# Patient Record
Sex: FEMALE | Race: WHITE | NOT HISPANIC OR LATINO | Employment: OTHER | URBAN - METROPOLITAN AREA
[De-identification: names, ages, dates, MRNs, and addresses within clinical notes are randomized per-mention and may not be internally consistent; named-entity substitution may affect disease eponyms.]

---

## 2017-01-10 ENCOUNTER — PATIENT MESSAGE (OUTPATIENT)
Dept: RHEUMATOLOGY | Facility: CLINIC | Age: 37
End: 2017-01-10

## 2017-01-10 ENCOUNTER — OFFICE VISIT (OUTPATIENT)
Dept: RHEUMATOLOGY | Facility: CLINIC | Age: 37
End: 2017-01-10
Payer: COMMERCIAL

## 2017-01-10 VITALS
WEIGHT: 130.63 LBS | BODY MASS INDEX: 24.66 KG/M2 | HEIGHT: 61 IN | SYSTOLIC BLOOD PRESSURE: 118 MMHG | DIASTOLIC BLOOD PRESSURE: 74 MMHG | HEART RATE: 79 BPM

## 2017-01-10 DIAGNOSIS — M32.8 OTHER FORMS OF SYSTEMIC LUPUS ERYTHEMATOSUS: Primary | ICD-10-CM

## 2017-01-10 PROCEDURE — 99999 PR PBB SHADOW E&M-EST. PATIENT-LVL II: CPT | Mod: PBBFAC,,, | Performed by: INTERNAL MEDICINE

## 2017-01-10 PROCEDURE — 1159F MED LIST DOCD IN RCRD: CPT | Mod: S$GLB,,, | Performed by: INTERNAL MEDICINE

## 2017-01-10 PROCEDURE — 99214 OFFICE O/P EST MOD 30 MIN: CPT | Mod: S$GLB,,, | Performed by: INTERNAL MEDICINE

## 2017-01-10 RX ORDER — HYDROXYCHLOROQUINE SULFATE 200 MG/1
200 TABLET, FILM COATED ORAL 2 TIMES DAILY
Qty: 60 TABLET | Refills: 5 | Status: SHIPPED | OUTPATIENT
Start: 2017-01-10 | End: 2017-06-26 | Stop reason: SDUPTHER

## 2017-01-10 NOTE — PROGRESS NOTES
Subjective:      Patient ID: Massiel Weston is a 34 y.o. female.    Chief Complaint: Pain    HPI   33 yo F with PMH of Hashimotos disease ( 2008), hypoglycemia, seizures here for pain. In summer 2014, she started to have to have fatigue. She also would have swelling of hands in morning.  She feels sore all over. She feels hazy.  Reports hair loss.  Reports pain in hands, wrists, and knees.  She has stiffness in morning for 2-3 hours.  Pain is 5/10.   Nothing improves the pain. Being active makes pain worse. She is on Savella for 2 months.Pain is achy and non-radiating.  She has photosensitivity over last several years. Reports painful ulcers. Hand fingers get swollen. Reports passing out in the past maybe from hypoglycemia.  Thinks she may have Raynauds. NO blood clots. Denies any miscarriages.  Reports poor sleep. No snoring.     Interval history: She has changed her diet. Reports that her pain has improved with diet.  Reports she has no pain. Denies any rashes.    She was in sun briefly but was there all day.  She continues plaquenil 200mg po BID.    Reports that her fatigue is better.  She reports that she is taking Lunesta which helps her sleep.  Reports she had one episode of 5-6 mouth ulcers that lasted a week.    Grandmother- SLE    Review of Systems :    Constitutional: Positive for fatigue. Negative for chills, diaphoresis, activity change and appetite change.   HENT: Negative for congestion, ear discharge, ear pain, facial swelling, mouth sores, sinus pressure, sneezing, sore throat, tinnitus and trouble swallowing.    Eyes: Negative for photophobia, pain, discharge, redness, itching and visual disturbance.   Respiratory: Negative for apnea, chest tightness, shortness of breath, wheezing and stridor.    Cardiovascular: Negative for leg swelling.   Gastrointestinal: Negative for nausea, abdominal pain, diarrhea, constipation, blood in stool, abdominal distention and anal bleeding.   Endocrine:  Negative for cold intolerance and heat intolerance.   Genitourinary: Negative for dysuria and difficulty urinating.   Musculoskeletal: Positive for myalgias, joint swelling, arthralgias, neck pain and neck stiffness. Negative for back pain and gait problem.   Skin: Negative for color change, pallor, rash and wound.   Neurological: Negative for dizziness, seizures, light-headedness and numbness.   Hematological: Negative for adenopathy. Does not bruise/bleed easily.   Psychiatric/Behavioral: Negative for sleep disturbance. The patient is not nervous/anxious.        Objective:   Physical Exam   Vitals reviewed.  Constitutional: She is oriented to person, place, and time.   HENT:   Head: Normocephalic and atraumatic.   Right Ear: External ear normal.   Left Ear: External ear normal.   Nose: Nose normal.   Mouth/Throat: Oropharynx is clear and moist. No oropharyngeal exudate.   Eyes: Conjunctivae and EOM are normal. Pupils are equal, round, and reactive to light. Right eye exhibits no discharge. Left eye exhibits no discharge. No scleral icterus.   Neck: Neck supple. No JVD present. No thyromegaly present.   Cardiovascular: Normal rate, regular rhythm, normal heart sounds and intact distal pulses.  Exam reveals no gallop and no friction rub.    No murmur heard.  Pulmonary/Chest: Effort normal and breath sounds normal. No respiratory distress. She has no wheezes. She has no rales. She exhibits no tenderness.   Abdominal: Soft. Bowel sounds are normal. She exhibits no distension and no mass. There is no tenderness. There is no rebound and no guarding.   Lymphadenopathy:     She has no cervical adenopathy.   Neurological: She is alert and oriented to person, place, and time. No cranial nerve deficit. Gait normal. Coordination normal.   Skin: Skin is dry. No rash noted. No erythema. No pallor.     Psychiatric: Affect and judgment normal.   Musculoskeletal: She exhibits no edema or tenderness.   FROM of all joints including  neck, shoulders, spine, ankles, wrists, knees, and ankles; no joint deformities noted or effusions, erythema or warmth; no tophi or nodules noted; no crepitus; no synovitis noted in hands or feet; no nail pitting or onycholysis        Outside labs:  Orly-1:640  dna-11  Ro,la-negative  rf-negative  Barboza, rnp-negative    Assessment:     35 yo F with PMH of Hashimotos disease ( 2008), hypoglycemia, seizures, complete hysterectomy due to fibroids, here for follow up of SLE. Patient with +ORLY, DNA, photosensitivity, Raynaud's, arthritis, and lymphopenia. Her DNA is negative here but was positive outside.   Her arthritis and myalgias have improved with decreasing processed foods.    Plan:       -continue plaquenil 200mg po BID ( eye exam due summer 2017)  -off tramadol  Tramadol  -labs today       rtc in  3-4 months

## 2017-06-26 ENCOUNTER — LAB VISIT (OUTPATIENT)
Dept: LAB | Facility: HOSPITAL | Age: 37
End: 2017-06-26
Attending: INTERNAL MEDICINE
Payer: COMMERCIAL

## 2017-06-26 ENCOUNTER — OFFICE VISIT (OUTPATIENT)
Dept: RHEUMATOLOGY | Facility: CLINIC | Age: 37
End: 2017-06-26
Payer: COMMERCIAL

## 2017-06-26 VITALS
WEIGHT: 133.31 LBS | BODY MASS INDEX: 25.17 KG/M2 | RESPIRATION RATE: 18 BRPM | SYSTOLIC BLOOD PRESSURE: 95 MMHG | HEIGHT: 61 IN | DIASTOLIC BLOOD PRESSURE: 64 MMHG | HEART RATE: 59 BPM

## 2017-06-26 DIAGNOSIS — M32.9 SYSTEMIC LUPUS ERYTHEMATOSUS, UNSPECIFIED SLE TYPE, UNSPECIFIED ORGAN INVOLVEMENT STATUS: ICD-10-CM

## 2017-06-26 DIAGNOSIS — Z79.899 LONG-TERM USE OF PLAQUENIL: ICD-10-CM

## 2017-06-26 DIAGNOSIS — M32.9 SYSTEMIC LUPUS ERYTHEMATOSUS, UNSPECIFIED SLE TYPE, UNSPECIFIED ORGAN INVOLVEMENT STATUS: Primary | ICD-10-CM

## 2017-06-26 LAB
BILIRUB UR QL STRIP: NEGATIVE
CLARITY UR REFRACT.AUTO: ABNORMAL
COLOR UR AUTO: YELLOW
CREAT UR-MCNC: 103 MG/DL
GLUCOSE UR QL STRIP: NEGATIVE
HGB UR QL STRIP: NEGATIVE
KETONES UR QL STRIP: NEGATIVE
LEUKOCYTE ESTERASE UR QL STRIP: NEGATIVE
NITRITE UR QL STRIP: NEGATIVE
PH UR STRIP: 5 [PH] (ref 5–8)
PROT UR QL STRIP: NEGATIVE
PROT UR-MCNC: <7 MG/DL
PROT/CREAT RATIO, UR: NORMAL
SP GR UR STRIP: 1.01 (ref 1–1.03)
URN SPEC COLLECT METH UR: ABNORMAL
UROBILINOGEN UR STRIP-ACNC: NEGATIVE EU/DL

## 2017-06-26 PROCEDURE — 81003 URINALYSIS AUTO W/O SCOPE: CPT

## 2017-06-26 PROCEDURE — 99214 OFFICE O/P EST MOD 30 MIN: CPT | Mod: S$GLB,,, | Performed by: INTERNAL MEDICINE

## 2017-06-26 PROCEDURE — 82570 ASSAY OF URINE CREATININE: CPT

## 2017-06-26 PROCEDURE — 99999 PR PBB SHADOW E&M-EST. PATIENT-LVL III: CPT | Mod: PBBFAC,,, | Performed by: INTERNAL MEDICINE

## 2017-06-26 RX ORDER — HYDROXYCHLOROQUINE SULFATE 200 MG/1
200 TABLET, FILM COATED ORAL 2 TIMES DAILY
Qty: 60 TABLET | Refills: 5 | Status: SHIPPED | OUTPATIENT
Start: 2017-06-26 | End: 2018-05-10 | Stop reason: SDUPTHER

## 2017-06-26 ASSESSMENT — ROUTINE ASSESSMENT OF PATIENT INDEX DATA (RAPID3)
PATIENT GLOBAL ASSESSMENT SCORE: 0
MDHAQ FUNCTION SCORE: 0
TOTAL RAPID3 SCORE: 0
AM STIFFNESS SCORE: 0, NO
PAIN SCORE: 0
FATIGUE SCORE: 2
PSYCHOLOGICAL DISTRESS SCORE: 0

## 2017-06-26 NOTE — PROGRESS NOTES
Subjective:      Patient ID: Massiel Weston is a 34 y.o. female.    Chief Complaint: Pain    HPI   35 yo F with PMH of Hashimotos disease ( 2008), hypoglycemia, seizures here for pain. In summer 2014, she started to have to have fatigue. She also would have swelling of hands in morning.  She feels sore all over. She feels hazy.  Reports hair loss.  Reports pain in hands, wrists, and knees.  She has stiffness in morning for 2-3 hours.  Pain is 5/10.   Nothing improves the pain. Being active makes pain worse. She is on Savella for 2 months.Pain is achy and non-radiating.  She has photosensitivity over last several years. Reports painful ulcers. Hand fingers get swollen. Reports passing out in the past maybe from hypoglycemia.  Thinks she may have Raynauds. NO blood clots. Denies any miscarriages.  Reports poor sleep. No snoring.     Interval history:Reports she has no pain. Denies any rashes.  She continues plaquenil 200mg po BID.    Reports that her fatigue is better.  She reports that she is taking Lunesta which helps her sleep.        Grandmother- SLE    Review of Systems :    Constitutional: Positive for fatigue. Negative for chills, diaphoresis, activity change and appetite change.   HENT: Negative for congestion, ear discharge, ear pain, facial swelling, mouth sores, sinus pressure, sneezing, sore throat, tinnitus and trouble swallowing.    Eyes: Negative for photophobia, pain, discharge, redness, itching and visual disturbance.   Respiratory: Negative for apnea, chest tightness, shortness of breath, wheezing and stridor.    Cardiovascular: Negative for leg swelling.   Gastrointestinal: Negative for nausea, abdominal pain, diarrhea, constipation, blood in stool, abdominal distention and anal bleeding.   Endocrine: Negative for cold intolerance and heat intolerance.   Genitourinary: Negative for dysuria and difficulty urinating.   Musculoskeletal: Positive for myalgias, joint swelling, arthralgias, neck  pain and neck stiffness. Negative for back pain and gait problem.   Skin: Negative for color change, pallor, rash and wound.   Neurological: Negative for dizziness, seizures, light-headedness and numbness.   Hematological: Negative for adenopathy. Does not bruise/bleed easily.   Psychiatric/Behavioral: Negative for sleep disturbance. The patient is not nervous/anxious.        Objective:   Physical Exam   Vitals reviewed.  Constitutional: She is oriented to person, place, and time.   HENT:   Head: Normocephalic and atraumatic.   Right Ear: External ear normal.   Left Ear: External ear normal.   Nose: Nose normal.   Mouth/Throat: Oropharynx is clear and moist. No oropharyngeal exudate.   Eyes: Conjunctivae and EOM are normal. Pupils are equal, round, and reactive to light. Right eye exhibits no discharge. Left eye exhibits no discharge. No scleral icterus.   Neck: Neck supple. No JVD present. No thyromegaly present.   Cardiovascular: Normal rate, regular rhythm, normal heart sounds and intact distal pulses.  Exam reveals no gallop and no friction rub.    No murmur heard.  Pulmonary/Chest: Effort normal and breath sounds normal. No respiratory distress. She has no wheezes. She has no rales. She exhibits no tenderness.   Abdominal: Soft. Bowel sounds are normal. She exhibits no distension and no mass. There is no tenderness. There is no rebound and no guarding.   Lymphadenopathy:     She has no cervical adenopathy.   Neurological: She is alert and oriented to person, place, and time. No cranial nerve deficit. Gait normal. Coordination normal.   Skin: Skin is dry. No rash noted. No erythema. No pallor.     Psychiatric: Affect and judgment normal.   Musculoskeletal: She exhibits no edema or tenderness.   FROM of all joints including neck, shoulders, spine, ankles, wrists, knees, and ankles; no joint deformities noted or effusions, erythema or warmth; no tophi or nodules noted; no crepitus; no synovitis noted in hands or  feet; no nail pitting or onycholysis        Outside labs:  Orly-1:640  dna-11  Ro,la-negative  rf-negative  Barboza, rnp-negative    Assessment:     36 yo F with PMH of Hashimotos disease ( 2008), hypoglycemia, seizures, complete hysterectomy due to fibroids, here for follow up of SLE. Patient with +ORLY, DNA, photosensitivity, Raynaud's, arthritis, and lymphopenia. Her DNA is negative here but was positive outside.   Her arthritis and myalgias have improved with decreasing processed foods.    Plan:       -continue plaquenil 200mg po BID ( eye exam due summer 2017)    -labs today       rtc in 5 months

## 2018-05-10 ENCOUNTER — PATIENT MESSAGE (OUTPATIENT)
Dept: RHEUMATOLOGY | Facility: CLINIC | Age: 38
End: 2018-05-10

## 2018-05-10 ENCOUNTER — OFFICE VISIT (OUTPATIENT)
Dept: RHEUMATOLOGY | Facility: CLINIC | Age: 38
End: 2018-05-10
Payer: COMMERCIAL

## 2018-05-10 ENCOUNTER — LAB VISIT (OUTPATIENT)
Dept: LAB | Facility: HOSPITAL | Age: 38
End: 2018-05-10
Attending: INTERNAL MEDICINE
Payer: COMMERCIAL

## 2018-05-10 VITALS
BODY MASS INDEX: 27 KG/M2 | DIASTOLIC BLOOD PRESSURE: 66 MMHG | SYSTOLIC BLOOD PRESSURE: 98 MMHG | HEIGHT: 61 IN | HEART RATE: 63 BPM | RESPIRATION RATE: 18 BRPM | WEIGHT: 143 LBS

## 2018-05-10 DIAGNOSIS — Z79.899 LONG-TERM USE OF PLAQUENIL: ICD-10-CM

## 2018-05-10 DIAGNOSIS — M32.9 SYSTEMIC LUPUS ERYTHEMATOSUS, UNSPECIFIED SLE TYPE, UNSPECIFIED ORGAN INVOLVEMENT STATUS: ICD-10-CM

## 2018-05-10 DIAGNOSIS — Z79.899 LONG-TERM USE OF PLAQUENIL: Primary | ICD-10-CM

## 2018-05-10 LAB
ALBUMIN SERPL BCP-MCNC: 3.7 G/DL
ALP SERPL-CCNC: 69 U/L
ALT SERPL W/O P-5'-P-CCNC: 12 U/L
ANION GAP SERPL CALC-SCNC: 6 MMOL/L
AST SERPL-CCNC: 17 U/L
BASOPHILS # BLD AUTO: 0.07 K/UL
BASOPHILS NFR BLD: 1.6 %
BILIRUB SERPL-MCNC: 0.4 MG/DL
BUN SERPL-MCNC: 14 MG/DL
C3 SERPL-MCNC: 104 MG/DL
C4 SERPL-MCNC: 27 MG/DL
CALCIUM SERPL-MCNC: 9.6 MG/DL
CHLORIDE SERPL-SCNC: 105 MMOL/L
CO2 SERPL-SCNC: 29 MMOL/L
CREAT SERPL-MCNC: 1 MG/DL
CRP SERPL-MCNC: <0.1 MG/L
DIFFERENTIAL METHOD: ABNORMAL
DSDNA AB SER-ACNC: NORMAL [IU]/ML
EOSINOPHIL # BLD AUTO: 0.3 K/UL
EOSINOPHIL NFR BLD: 7.6 %
ERYTHROCYTE [DISTWIDTH] IN BLOOD BY AUTOMATED COUNT: 11.9 %
ERYTHROCYTE [SEDIMENTATION RATE] IN BLOOD BY WESTERGREN METHOD: 2 MM/HR
EST. GFR  (AFRICAN AMERICAN): >60 ML/MIN/1.73 M^2
EST. GFR  (NON AFRICAN AMERICAN): >60 ML/MIN/1.73 M^2
GLUCOSE SERPL-MCNC: 86 MG/DL
HCT VFR BLD AUTO: 40.3 %
HGB BLD-MCNC: 13.7 G/DL
IMM GRANULOCYTES # BLD AUTO: 0.01 K/UL
IMM GRANULOCYTES NFR BLD AUTO: 0.2 %
LYMPHOCYTES # BLD AUTO: 1.1 K/UL
LYMPHOCYTES NFR BLD: 25 %
MCH RBC QN AUTO: 31.7 PG
MCHC RBC AUTO-ENTMCNC: 34 G/DL
MCV RBC AUTO: 93 FL
MONOCYTES # BLD AUTO: 0.5 K/UL
MONOCYTES NFR BLD: 10.8 %
NEUTROPHILS # BLD AUTO: 2.4 K/UL
NEUTROPHILS NFR BLD: 54.8 %
NRBC BLD-RTO: 0 /100 WBC
PLATELET # BLD AUTO: 176 K/UL
PMV BLD AUTO: 10.7 FL
POTASSIUM SERPL-SCNC: 4.6 MMOL/L
PROT SERPL-MCNC: 7 G/DL
RBC # BLD AUTO: 4.32 M/UL
SODIUM SERPL-SCNC: 140 MMOL/L
WBC # BLD AUTO: 4.36 K/UL

## 2018-05-10 PROCEDURE — 80053 COMPREHEN METABOLIC PANEL: CPT

## 2018-05-10 PROCEDURE — 85025 COMPLETE CBC W/AUTO DIFF WBC: CPT

## 2018-05-10 PROCEDURE — 99999 PR PBB SHADOW E&M-EST. PATIENT-LVL III: CPT | Mod: PBBFAC,,, | Performed by: INTERNAL MEDICINE

## 2018-05-10 PROCEDURE — 86160 COMPLEMENT ANTIGEN: CPT

## 2018-05-10 PROCEDURE — 85651 RBC SED RATE NONAUTOMATED: CPT

## 2018-05-10 PROCEDURE — 86225 DNA ANTIBODY NATIVE: CPT

## 2018-05-10 PROCEDURE — 99214 OFFICE O/P EST MOD 30 MIN: CPT | Mod: S$GLB,,, | Performed by: INTERNAL MEDICINE

## 2018-05-10 PROCEDURE — 86160 COMPLEMENT ANTIGEN: CPT | Mod: 59

## 2018-05-10 PROCEDURE — 3008F BODY MASS INDEX DOCD: CPT | Mod: CPTII,S$GLB,, | Performed by: INTERNAL MEDICINE

## 2018-05-10 PROCEDURE — 86140 C-REACTIVE PROTEIN: CPT

## 2018-05-10 PROCEDURE — 36415 COLL VENOUS BLD VENIPUNCTURE: CPT

## 2018-05-10 RX ORDER — HYDROXYCHLOROQUINE SULFATE 200 MG/1
200 TABLET, FILM COATED ORAL 2 TIMES DAILY
Qty: 60 TABLET | Refills: 5 | Status: SHIPPED | OUTPATIENT
Start: 2018-05-10 | End: 2018-07-05 | Stop reason: SDUPTHER

## 2018-05-10 NOTE — PROGRESS NOTES
Subjective:      Patient ID: Massiel Weston is a 34 y.o. female.    Chief Complaint: Pain    HPI   33 yo F with PMH of Hashimotos disease ( 2008), hypoglycemia, seizures here for pain. In summer 2014, she started to have to have fatigue. She also would have swelling of hands in morning.  She feels sore all over. She feels hazy.  Reports hair loss.  Reports pain in hands, wrists, and knees.  She has stiffness in morning for 2-3 hours.  Pain is 5/10.   Nothing improves the pain. Being active makes pain worse. She is on Savella for 2 months.Pain is achy and non-radiating.  She has photosensitivity over last several years. Reports painful ulcers. Hand fingers get swollen. Reports passing out in the past maybe from hypoglycemia.  Thinks she may have Raynauds. NO blood clots. Denies any miscarriages.  Reports poor sleep. No snoring.     Interval history: She reports she broke her foot before tien and worse cast around 8 weeks.  Reports she has no pain. Denies any rashes.  She continues plaquenil 200mg po BID.    Reports that her fatigue is better.          Grandmother- SLE    Review of Systems :    Constitutional: Positive for fatigue. Negative for chills, diaphoresis, activity change and appetite change.   HENT: Negative for congestion, ear discharge, ear pain, facial swelling, mouth sores, sinus pressure, sneezing, sore throat, tinnitus and trouble swallowing.    Eyes: Negative for photophobia, pain, discharge, redness, itching and visual disturbance.   Respiratory: Negative for apnea, chest tightness, shortness of breath, wheezing and stridor.    Cardiovascular: Negative for leg swelling.   Gastrointestinal: Negative for nausea, abdominal pain, diarrhea, constipation, blood in stool, abdominal distention and anal bleeding.   Endocrine: Negative for cold intolerance and heat intolerance.   Genitourinary: Negative for dysuria and difficulty urinating.   Musculoskeletal: Positive for myalgias, joint swelling,  arthralgias, neck pain and neck stiffness. Negative for back pain and gait problem.   Skin: Negative for color change, pallor, rash and wound.   Neurological: Negative for dizziness, seizures, light-headedness and numbness.   Hematological: Negative for adenopathy. Does not bruise/bleed easily.   Psychiatric/Behavioral: Negative for sleep disturbance. The patient is not nervous/anxious.        Objective:   Physical Exam   Vitals reviewed.  Constitutional: She is oriented to person, place, and time.   HENT:   Head: Normocephalic and atraumatic.   Right Ear: External ear normal.   Left Ear: External ear normal.   Nose: Nose normal.   Mouth/Throat: Oropharynx is clear and moist. No oropharyngeal exudate.   Eyes: Conjunctivae and EOM are normal. Pupils are equal, round, and reactive to light. Right eye exhibits no discharge. Left eye exhibits no discharge. No scleral icterus.   Neck: Neck supple. No JVD present. No thyromegaly present.   Cardiovascular: Normal rate, regular rhythm, normal heart sounds and intact distal pulses.  Exam reveals no gallop and no friction rub.    No murmur heard.  Pulmonary/Chest: Effort normal and breath sounds normal. No respiratory distress. She has no wheezes. She has no rales. She exhibits no tenderness.   Abdominal: Soft. Bowel sounds are normal. She exhibits no distension and no mass. There is no tenderness. There is no rebound and no guarding.   Lymphadenopathy:     She has no cervical adenopathy.   Neurological: She is alert and oriented to person, place, and time. No cranial nerve deficit. Gait normal. Coordination normal.   Skin: Skin is dry. No rash noted. No erythema. No pallor.     Psychiatric: Affect and judgment normal.   Musculoskeletal: She exhibits no edema or tenderness.   FROM of all joints including neck, shoulders, spine, ankles, wrists, knees, and ankles; no joint deformities noted or effusions, erythema or warmth; no tophi or nodules noted; no crepitus; no synovitis  noted in hands or feet; no nail pitting or onycholysis        Outside labs:  Orly-1:640  dna-11  Ro,la-negative  rf-negative  Barboza, rnp-negative    Assessment:     37 yo F with PMH of Hashimotos disease ( 2008), hypoglycemia, seizures, complete hysterectomy due to fibroids, here for follow up of SLE. Patient with +ORLY, DNA, photosensitivity, Raynaud's, arthritis, and lymphopenia. Her DNA is negative here but was positive outside.   Her arthritis and myalgias have improved with decreasing processed foods.    Plan:       -continue plaquenil 200mg po BID ( eye exam due summer 2018)    -labs today       rtc in 6   months

## 2018-07-05 RX ORDER — HYDROXYCHLOROQUINE SULFATE 200 MG/1
TABLET, FILM COATED ORAL
Qty: 60 TABLET | Refills: 6 | Status: SHIPPED | OUTPATIENT
Start: 2018-07-05 | End: 2019-05-16 | Stop reason: SDUPTHER

## 2018-11-01 ENCOUNTER — OFFICE VISIT (OUTPATIENT)
Dept: RHEUMATOLOGY | Facility: CLINIC | Age: 38
End: 2018-11-01
Payer: COMMERCIAL

## 2018-11-01 ENCOUNTER — LAB VISIT (OUTPATIENT)
Dept: LAB | Facility: HOSPITAL | Age: 38
End: 2018-11-01
Attending: INTERNAL MEDICINE
Payer: COMMERCIAL

## 2018-11-01 VITALS
HEART RATE: 61 BPM | HEIGHT: 61 IN | DIASTOLIC BLOOD PRESSURE: 60 MMHG | SYSTOLIC BLOOD PRESSURE: 92 MMHG | BODY MASS INDEX: 27.02 KG/M2

## 2018-11-01 DIAGNOSIS — M25.50 POLYARTHRALGIA: Primary | ICD-10-CM

## 2018-11-01 DIAGNOSIS — Z79.899 LONG-TERM USE OF PLAQUENIL: ICD-10-CM

## 2018-11-01 DIAGNOSIS — M25.50 POLYARTHRALGIA: ICD-10-CM

## 2018-11-01 DIAGNOSIS — M32.9 SYSTEMIC LUPUS ERYTHEMATOSUS, UNSPECIFIED SLE TYPE, UNSPECIFIED ORGAN INVOLVEMENT STATUS: ICD-10-CM

## 2018-11-01 LAB
ALBUMIN SERPL BCP-MCNC: 3.8 G/DL
ALP SERPL-CCNC: 64 U/L
ALT SERPL W/O P-5'-P-CCNC: 13 U/L
ANION GAP SERPL CALC-SCNC: 6 MMOL/L
AST SERPL-CCNC: 21 U/L
BASOPHILS # BLD AUTO: 0.06 K/UL
BASOPHILS NFR BLD: 1.5 %
BILIRUB SERPL-MCNC: 0.6 MG/DL
BUN SERPL-MCNC: 14 MG/DL
C3 SERPL-MCNC: 104 MG/DL
C4 SERPL-MCNC: 30 MG/DL
CALCIUM SERPL-MCNC: 9.1 MG/DL
CHLORIDE SERPL-SCNC: 104 MMOL/L
CO2 SERPL-SCNC: 28 MMOL/L
CREAT SERPL-MCNC: 0.9 MG/DL
CRP SERPL-MCNC: 0.1 MG/L
DIFFERENTIAL METHOD: ABNORMAL
EOSINOPHIL # BLD AUTO: 0.3 K/UL
EOSINOPHIL NFR BLD: 7.9 %
ERYTHROCYTE [DISTWIDTH] IN BLOOD BY AUTOMATED COUNT: 12.2 %
ERYTHROCYTE [SEDIMENTATION RATE] IN BLOOD BY WESTERGREN METHOD: <2 MM/HR
EST. GFR  (AFRICAN AMERICAN): >60 ML/MIN/1.73 M^2
EST. GFR  (NON AFRICAN AMERICAN): >60 ML/MIN/1.73 M^2
ESTIMATED AVG GLUCOSE: 88 MG/DL
GLUCOSE SERPL-MCNC: 81 MG/DL
HBA1C MFR BLD HPLC: 4.7 %
HCT VFR BLD AUTO: 37.7 %
HGB BLD-MCNC: 13.3 G/DL
IMM GRANULOCYTES # BLD AUTO: 0.01 K/UL
IMM GRANULOCYTES NFR BLD AUTO: 0.3 %
LYMPHOCYTES # BLD AUTO: 1.4 K/UL
LYMPHOCYTES NFR BLD: 36.7 %
MCH RBC QN AUTO: 32.4 PG
MCHC RBC AUTO-ENTMCNC: 35.3 G/DL
MCV RBC AUTO: 92 FL
MONOCYTES # BLD AUTO: 0.4 K/UL
MONOCYTES NFR BLD: 8.9 %
NEUTROPHILS # BLD AUTO: 1.8 K/UL
NEUTROPHILS NFR BLD: 44.7 %
NRBC BLD-RTO: 0 /100 WBC
PLATELET # BLD AUTO: 186 K/UL
PMV BLD AUTO: 10.5 FL
POTASSIUM SERPL-SCNC: 3.8 MMOL/L
PROT SERPL-MCNC: 7.2 G/DL
RBC # BLD AUTO: 4.1 M/UL
SODIUM SERPL-SCNC: 138 MMOL/L
WBC # BLD AUTO: 3.92 K/UL

## 2018-11-01 PROCEDURE — 86160 COMPLEMENT ANTIGEN: CPT

## 2018-11-01 PROCEDURE — 86225 DNA ANTIBODY NATIVE: CPT

## 2018-11-01 PROCEDURE — 85652 RBC SED RATE AUTOMATED: CPT

## 2018-11-01 PROCEDURE — 86160 COMPLEMENT ANTIGEN: CPT | Mod: 59

## 2018-11-01 PROCEDURE — 3008F BODY MASS INDEX DOCD: CPT | Mod: CPTII,S$GLB,, | Performed by: INTERNAL MEDICINE

## 2018-11-01 PROCEDURE — 36415 COLL VENOUS BLD VENIPUNCTURE: CPT

## 2018-11-01 PROCEDURE — 99214 OFFICE O/P EST MOD 30 MIN: CPT | Mod: S$GLB,,, | Performed by: INTERNAL MEDICINE

## 2018-11-01 PROCEDURE — 99999 PR PBB SHADOW E&M-EST. PATIENT-LVL III: CPT | Mod: PBBFAC,,, | Performed by: INTERNAL MEDICINE

## 2018-11-01 PROCEDURE — 85025 COMPLETE CBC W/AUTO DIFF WBC: CPT

## 2018-11-01 PROCEDURE — 86140 C-REACTIVE PROTEIN: CPT

## 2018-11-01 PROCEDURE — 80053 COMPREHEN METABOLIC PANEL: CPT

## 2018-11-01 PROCEDURE — 83036 HEMOGLOBIN GLYCOSYLATED A1C: CPT

## 2018-11-01 RX ORDER — PENTOSAN POLYSULFATE SODIUM 100 MG/1
100 CAPSULE, GELATIN COATED ORAL 2 TIMES DAILY
COMMUNITY
Start: 2018-10-25 | End: 2021-11-17

## 2018-11-01 RX ORDER — ESZOPICLONE 3 MG/1
TABLET, FILM COATED ORAL
Refills: 5 | COMMUNITY
Start: 2018-10-20 | End: 2019-08-30 | Stop reason: SDUPTHER

## 2018-11-01 NOTE — PROGRESS NOTES
Subjective:      Patient ID: Massiel Weston is a 34 y.o. female.    Chief Complaint: Pain    HPI   35 yo F with PMH of Hashimotos disease ( 2008), hypoglycemia, seizures here for pain. In summer 2014, she started to have to have fatigue. She also would have swelling of hands in morning.  She feels sore all over. She feels hazy.  Reports hair loss.  Reports pain in hands, wrists, and knees.  She has stiffness in morning for 2-3 hours.  Pain is 5/10.   Nothing improves the pain. Being active makes pain worse. She is on Savella for 2 months.Pain is achy and non-radiating.  She has photosensitivity over last several years. Reports painful ulcers. Hand fingers get swollen. Reports passing out in the past maybe from hypoglycemia.  Thinks she may have Raynauds. NO blood clots. Denies any miscarriages.  Reports poor sleep. No snoring.     Interval history: She reports she has mild hand pain. Reports mild swelling in hands in morning.  Reports diffuse body stiffness in morning for an hour. Denies any rashes.  She continues plaquenil 200mg po BID.    Reports that her fatigue is better. Reports she had oral ulcers in her mouth 3 months ago but had sun exposure.          Grandmother- SLE    Review of Systems :    Constitutional: Positive for fatigue. Negative for chills, diaphoresis, activity change and appetite change.   HENT: Negative for congestion, ear discharge, ear pain, facial swelling, mouth sores, sinus pressure, sneezing, sore throat, tinnitus and trouble swallowing.    Eyes: Negative for photophobia, pain, discharge, redness, itching and visual disturbance.   Respiratory: Negative for apnea, chest tightness, shortness of breath, wheezing and stridor.    Cardiovascular: Negative for leg swelling.   Gastrointestinal: Negative for nausea, abdominal pain, diarrhea, constipation, blood in stool, abdominal distention and anal bleeding.   Endocrine: Negative for cold intolerance and heat intolerance.   Genitourinary:  Negative for dysuria and difficulty urinating.   Musculoskeletal: Positive for myalgias, joint swelling, arthralgias, neck pain and neck stiffness. Negative for back pain and gait problem.   Skin: Negative for color change, pallor, rash and wound.   Neurological: Negative for dizziness, seizures, light-headedness and numbness.   Hematological: Negative for adenopathy. Does not bruise/bleed easily.   Psychiatric/Behavioral: Negative for sleep disturbance. The patient is not nervous/anxious.        Objective:   Physical Exam   Vitals reviewed.  Constitutional: She is oriented to person, place, and time.   HENT:   Head: Normocephalic and atraumatic.   Right Ear: External ear normal.   Left Ear: External ear normal.   Nose: Nose normal.   Mouth/Throat: Oropharynx is clear and moist. No oropharyngeal exudate.   Eyes: Conjunctivae and EOM are normal. Pupils are equal, round, and reactive to light. Right eye exhibits no discharge. Left eye exhibits no discharge. No scleral icterus.   Neck: Neck supple. No JVD present. No thyromegaly present.   Cardiovascular: Normal rate, regular rhythm, normal heart sounds and intact distal pulses.  Exam reveals no gallop and no friction rub.    No murmur heard.  Pulmonary/Chest: Effort normal and breath sounds normal. No respiratory distress. She has no wheezes. She has no rales. She exhibits no tenderness.   Abdominal: Soft. Bowel sounds are normal. She exhibits no distension and no mass. There is no tenderness. There is no rebound and no guarding.   Lymphadenopathy:     She has no cervical adenopathy.   Neurological: She is alert and oriented to person, place, and time. No cranial nerve deficit. Gait normal. Coordination normal.   Skin: Skin is dry. No rash noted. No erythema. No pallor.     Psychiatric: Affect and judgment normal.   Musculoskeletal: She exhibits no edema or tenderness.   FROM of all joints including neck, shoulders, spine, ankles, wrists, knees, and ankles; no joint  deformities noted or effusions, erythema or warmth; no tophi or nodules noted; no crepitus; no synovitis noted in hands or feet; no nail pitting or onycholysis        Outside labs:  Orly-1:640  dna-11  Ro,la-negative  rf-negative  Barboza, rnp-negative    Assessment:     37 yo F with PMH of Hashimotos disease ( 2008), hypoglycemia, seizures, complete hysterectomy due to fibroids, here for follow up of SLE. Patient with +ORLY, DNA, photosensitivity, Raynaud's, arthritis, and lymphopenia. Her DNA is negative here but was positive outside.   Her arthritis and myalgias have improved with decreasing processed foods.    Reports that her optho found early signs of diabetes so will check a1c.  Plan:       -continue plaquenil 200mg po BID ( eye exam done outside in October 2018)  -labs today     rtc in 6   months

## 2018-11-02 ENCOUNTER — PATIENT MESSAGE (OUTPATIENT)
Dept: RHEUMATOLOGY | Facility: CLINIC | Age: 38
End: 2018-11-02

## 2018-11-02 LAB — DSDNA AB SER-ACNC: NORMAL [IU]/ML

## 2019-05-02 PROBLEM — K81.1 CHRONIC CHOLECYSTITIS WITHOUT CALCULUS: Status: ACTIVE | Noted: 2019-05-02

## 2019-05-16 ENCOUNTER — LAB VISIT (OUTPATIENT)
Dept: LAB | Facility: HOSPITAL | Age: 39
End: 2019-05-16
Attending: INTERNAL MEDICINE
Payer: COMMERCIAL

## 2019-05-16 ENCOUNTER — OFFICE VISIT (OUTPATIENT)
Dept: RHEUMATOLOGY | Facility: CLINIC | Age: 39
End: 2019-05-16
Payer: COMMERCIAL

## 2019-05-16 VITALS
HEART RATE: 96 BPM | WEIGHT: 135.81 LBS | BODY MASS INDEX: 25.64 KG/M2 | SYSTOLIC BLOOD PRESSURE: 96 MMHG | DIASTOLIC BLOOD PRESSURE: 59 MMHG | HEIGHT: 61 IN

## 2019-05-16 DIAGNOSIS — Z79.899 LONG-TERM USE OF PLAQUENIL: ICD-10-CM

## 2019-05-16 DIAGNOSIS — M32.9 SYSTEMIC LUPUS ERYTHEMATOSUS, UNSPECIFIED SLE TYPE, UNSPECIFIED ORGAN INVOLVEMENT STATUS: Primary | ICD-10-CM

## 2019-05-16 DIAGNOSIS — M32.9 SYSTEMIC LUPUS ERYTHEMATOSUS, UNSPECIFIED SLE TYPE, UNSPECIFIED ORGAN INVOLVEMENT STATUS: ICD-10-CM

## 2019-05-16 LAB
ALBUMIN SERPL BCP-MCNC: 3.8 G/DL (ref 3.5–5.2)
ALP SERPL-CCNC: 72 U/L (ref 55–135)
ALT SERPL W/O P-5'-P-CCNC: 15 U/L (ref 10–44)
ANION GAP SERPL CALC-SCNC: 8 MMOL/L (ref 8–16)
AST SERPL-CCNC: 21 U/L (ref 10–40)
BASOPHILS # BLD AUTO: 0.08 K/UL (ref 0–0.2)
BASOPHILS NFR BLD: 2.3 % (ref 0–1.9)
BILIRUB SERPL-MCNC: 0.7 MG/DL (ref 0.1–1)
BUN SERPL-MCNC: 12 MG/DL (ref 6–20)
C3 SERPL-MCNC: 120 MG/DL (ref 50–180)
C4 SERPL-MCNC: 34 MG/DL (ref 11–44)
CALCIUM SERPL-MCNC: 9.7 MG/DL (ref 8.7–10.5)
CHLORIDE SERPL-SCNC: 103 MMOL/L (ref 95–110)
CO2 SERPL-SCNC: 27 MMOL/L (ref 23–29)
CREAT SERPL-MCNC: 0.9 MG/DL (ref 0.5–1.4)
CRP SERPL-MCNC: 0.3 MG/L (ref 0–8.2)
DIFFERENTIAL METHOD: ABNORMAL
EOSINOPHIL # BLD AUTO: 0.3 K/UL (ref 0–0.5)
EOSINOPHIL NFR BLD: 7.1 % (ref 0–8)
ERYTHROCYTE [DISTWIDTH] IN BLOOD BY AUTOMATED COUNT: 11.6 % (ref 11.5–14.5)
ERYTHROCYTE [SEDIMENTATION RATE] IN BLOOD BY WESTERGREN METHOD: 3 MM/HR (ref 0–36)
ERYTHROCYTE [SEDIMENTATION RATE] IN BLOOD BY WESTERGREN METHOD: 3 MM/HR (ref 0–36)
EST. GFR  (AFRICAN AMERICAN): >60 ML/MIN/1.73 M^2
EST. GFR  (NON AFRICAN AMERICAN): >60 ML/MIN/1.73 M^2
GLUCOSE SERPL-MCNC: 85 MG/DL (ref 70–110)
HCT VFR BLD AUTO: 39.1 % (ref 37–48.5)
HGB BLD-MCNC: 13.6 G/DL (ref 12–16)
IMM GRANULOCYTES # BLD AUTO: 0.01 K/UL (ref 0–0.04)
IMM GRANULOCYTES NFR BLD AUTO: 0.3 % (ref 0–0.5)
LYMPHOCYTES # BLD AUTO: 1.2 K/UL (ref 1–4.8)
LYMPHOCYTES NFR BLD: 33 % (ref 18–48)
MCH RBC QN AUTO: 32.2 PG (ref 27–31)
MCHC RBC AUTO-ENTMCNC: 34.8 G/DL (ref 32–36)
MCV RBC AUTO: 92 FL (ref 82–98)
MONOCYTES # BLD AUTO: 0.4 K/UL (ref 0.3–1)
MONOCYTES NFR BLD: 11.1 % (ref 4–15)
NEUTROPHILS # BLD AUTO: 1.6 K/UL (ref 1.8–7.7)
NEUTROPHILS NFR BLD: 46.2 % (ref 38–73)
NRBC BLD-RTO: 0 /100 WBC
PLATELET # BLD AUTO: 219 K/UL (ref 150–350)
PMV BLD AUTO: 9.8 FL (ref 9.2–12.9)
POTASSIUM SERPL-SCNC: 3.9 MMOL/L (ref 3.5–5.1)
PROT SERPL-MCNC: 7.1 G/DL (ref 6–8.4)
RBC # BLD AUTO: 4.23 M/UL (ref 4–5.4)
SODIUM SERPL-SCNC: 138 MMOL/L (ref 136–145)
WBC # BLD AUTO: 3.52 K/UL (ref 3.9–12.7)

## 2019-05-16 PROCEDURE — 86225 DNA ANTIBODY NATIVE: CPT

## 2019-05-16 PROCEDURE — 3008F PR BODY MASS INDEX (BMI) DOCUMENTED: ICD-10-PCS | Mod: CPTII,S$GLB,, | Performed by: INTERNAL MEDICINE

## 2019-05-16 PROCEDURE — 85025 COMPLETE CBC W/AUTO DIFF WBC: CPT

## 2019-05-16 PROCEDURE — 99999 PR PBB SHADOW E&M-EST. PATIENT-LVL III: CPT | Mod: PBBFAC,,, | Performed by: INTERNAL MEDICINE

## 2019-05-16 PROCEDURE — 85652 RBC SED RATE AUTOMATED: CPT

## 2019-05-16 PROCEDURE — 36415 COLL VENOUS BLD VENIPUNCTURE: CPT

## 2019-05-16 PROCEDURE — 99214 OFFICE O/P EST MOD 30 MIN: CPT | Mod: S$GLB,,, | Performed by: INTERNAL MEDICINE

## 2019-05-16 PROCEDURE — 80053 COMPREHEN METABOLIC PANEL: CPT

## 2019-05-16 PROCEDURE — 86140 C-REACTIVE PROTEIN: CPT

## 2019-05-16 PROCEDURE — 99999 PR PBB SHADOW E&M-EST. PATIENT-LVL III: ICD-10-PCS | Mod: PBBFAC,,, | Performed by: INTERNAL MEDICINE

## 2019-05-16 PROCEDURE — 3008F BODY MASS INDEX DOCD: CPT | Mod: CPTII,S$GLB,, | Performed by: INTERNAL MEDICINE

## 2019-05-16 PROCEDURE — 86160 COMPLEMENT ANTIGEN: CPT

## 2019-05-16 PROCEDURE — 99214 PR OFFICE/OUTPT VISIT, EST, LEVL IV, 30-39 MIN: ICD-10-PCS | Mod: S$GLB,,, | Performed by: INTERNAL MEDICINE

## 2019-05-16 PROCEDURE — 86160 COMPLEMENT ANTIGEN: CPT | Mod: 59

## 2019-05-16 RX ORDER — HYDROXYCHLOROQUINE SULFATE 200 MG/1
200 TABLET, FILM COATED ORAL 2 TIMES DAILY
Qty: 60 TABLET | Refills: 6 | Status: SHIPPED | OUTPATIENT
Start: 2019-05-16 | End: 2019-11-12 | Stop reason: SDUPTHER

## 2019-05-16 NOTE — PROGRESS NOTES
Subjective:      Patient ID: Massiel Weston is a 34 y.o. female.    Chief Complaint: Pain    HPI   35 yo F with PMH of Hashimotos disease ( 2008), hypoglycemia, seizures here for pain. In summer 2014, she started to have to have fatigue. She also would have swelling of hands in morning.  She feels sore all over. She feels hazy.  Reports hair loss.  Reports pain in hands, wrists, and knees.  She has stiffness in morning for 2-3 hours.  Pain is 5/10.   Nothing improves the pain. Being active makes pain worse. She is on Savella for 2 months.Pain is achy and non-radiating.  She has photosensitivity over last several years. Reports painful ulcers. Hand fingers get swollen. Reports passing out in the past maybe from hypoglycemia.  Thinks she may have Raynauds. NO blood clots. Denies any miscarriages.  Reports poor sleep. No snoring.     Interval history:  She had gallbladder out  2 weeks ago. Denies any rashes, oral ulcers, fevers, or raynauds.  She reports she has mild hand pain. Pain level is 2/10, aching non radiating.  Reports diffuse body stiffness in morning for an hour. Denies any rashes.She continues plaquenil 200mg po BID.  Reports that her fatigue is better.        Grandmother- SLE    Review of Systems :    Constitutional: Positive for fatigue. Negative for chills, diaphoresis, activity change and appetite change.   HENT: Negative for congestion, ear discharge, ear pain, facial swelling, mouth sores, sinus pressure, sneezing, sore throat, tinnitus and trouble swallowing.    Eyes: Negative for photophobia, pain, discharge, redness, itching and visual disturbance.   Respiratory: Negative for apnea, chest tightness, shortness of breath, wheezing and stridor.    Cardiovascular: Negative for leg swelling.   Gastrointestinal: Negative for nausea, abdominal pain, diarrhea, constipation, blood in stool, abdominal distention and anal bleeding.   Endocrine: Negative for cold intolerance and heat intolerance.    Genitourinary: Negative for dysuria and difficulty urinating.   Musculoskeletal: Positive for myalgias, joint swelling, arthralgias, neck pain and neck stiffness. Negative for back pain and gait problem.   Skin: Negative for color change, pallor, rash and wound.   Neurological: Negative for dizziness, seizures, light-headedness and numbness.   Hematological: Negative for adenopathy. Does not bruise/bleed easily.   Psychiatric/Behavioral: Negative for sleep disturbance. The patient is not nervous/anxious.        Objective:   Physical Exam   Vitals reviewed.  Constitutional: She is oriented to person, place, and time.   HENT:   Head: Normocephalic and atraumatic.   Right Ear: External ear normal.   Left Ear: External ear normal.   Nose: Nose normal.   Mouth/Throat: Oropharynx is clear and moist. No oropharyngeal exudate.   Eyes: Conjunctivae and EOM are normal. Pupils are equal, round, and reactive to light. Right eye exhibits no discharge. Left eye exhibits no discharge. No scleral icterus.   Neck: Neck supple. No JVD present. No thyromegaly present.   Cardiovascular: Normal rate, regular rhythm, normal heart sounds and intact distal pulses.  Exam reveals no gallop and no friction rub.    No murmur heard.  Pulmonary/Chest: Effort normal and breath sounds normal. No respiratory distress. She has no wheezes. She has no rales. She exhibits no tenderness.   Abdominal: Soft. Bowel sounds are normal. She exhibits no distension and no mass. There is no tenderness. There is no rebound and no guarding.   Lymphadenopathy:     She has no cervical adenopathy.   Neurological: She is alert and oriented to person, place, and time. No cranial nerve deficit. Gait normal. Coordination normal.   Skin: Skin is dry. No rash noted. No erythema. No pallor.     Psychiatric: Affect and judgment normal.   Musculoskeletal: She exhibits no edema or tenderness.   FROM of all joints including neck, shoulders, spine, ankles, wrists, knees, and  ankles; no joint deformities noted or effusions, erythema or warmth; no tophi or nodules noted; no crepitus; no synovitis noted in hands or feet; no nail pitting or onycholysis        Outside labs:  Orly-1:640  dna-11  Ro,la-negative  rf-negative  Barboza, rnp-negative    Assessment:     40 yo F with PMH of Hashimotos disease ( 2008), hypoglycemia, seizures, complete hysterectomy due to fibroids, here for follow up of SLE. Patient with +ORLY, DNA, photosensitivity, Raynaud's, arthritis, and lymphopenia. Her DNA is negative here but was positive outside.   Her arthritis and myalgias have improved with decreasing processed foods.   Plan:       -continue plaquenil 200mg po BID ( eye exam done outside in October 2018)  -labs today     rtc in 6   months

## 2019-05-17 ENCOUNTER — PATIENT MESSAGE (OUTPATIENT)
Dept: RHEUMATOLOGY | Facility: CLINIC | Age: 39
End: 2019-05-17

## 2019-05-17 LAB — DSDNA AB SER-ACNC: NORMAL [IU]/ML

## 2019-07-08 ENCOUNTER — PATIENT MESSAGE (OUTPATIENT)
Dept: RHEUMATOLOGY | Facility: CLINIC | Age: 39
End: 2019-07-08

## 2019-07-10 PROBLEM — Z71.3 DIETARY COUNSELING: Status: ACTIVE | Noted: 2019-07-10

## 2019-07-10 PROBLEM — Z51.81 ENCOUNTER FOR THERAPEUTIC DRUG MONITORING: Status: ACTIVE | Noted: 2019-07-10

## 2019-09-10 PROBLEM — Z79.890 HORMONE REPLACEMENT THERAPY: Chronic | Status: ACTIVE | Noted: 2019-09-10

## 2019-09-10 PROBLEM — K81.1 CHRONIC CHOLECYSTITIS WITHOUT CALCULUS: Status: RESOLVED | Noted: 2019-05-02 | Resolved: 2019-09-10

## 2019-10-03 ENCOUNTER — TELEPHONE (OUTPATIENT)
Dept: RHEUMATOLOGY | Facility: CLINIC | Age: 39
End: 2019-10-03

## 2019-10-03 ENCOUNTER — PATIENT MESSAGE (OUTPATIENT)
Dept: RHEUMATOLOGY | Facility: CLINIC | Age: 39
End: 2019-10-03

## 2019-10-03 NOTE — TELEPHONE ENCOUNTER
Left message for patient to call me back concerning her appointment that will cancelled. I also left a message on her my [facility] portal concerning the cancelling and rescheduling of this appointment

## 2019-11-12 ENCOUNTER — LAB VISIT (OUTPATIENT)
Dept: LAB | Facility: HOSPITAL | Age: 39
End: 2019-11-12
Attending: INTERNAL MEDICINE
Payer: COMMERCIAL

## 2019-11-12 ENCOUNTER — OFFICE VISIT (OUTPATIENT)
Dept: RHEUMATOLOGY | Facility: CLINIC | Age: 39
End: 2019-11-12
Payer: COMMERCIAL

## 2019-11-12 VITALS
DIASTOLIC BLOOD PRESSURE: 65 MMHG | HEART RATE: 72 BPM | HEIGHT: 61 IN | SYSTOLIC BLOOD PRESSURE: 107 MMHG | WEIGHT: 135.81 LBS | BODY MASS INDEX: 25.64 KG/M2

## 2019-11-12 DIAGNOSIS — M32.9 SYSTEMIC LUPUS ERYTHEMATOSUS, UNSPECIFIED SLE TYPE, UNSPECIFIED ORGAN INVOLVEMENT STATUS: Primary | ICD-10-CM

## 2019-11-12 DIAGNOSIS — M32.9 SYSTEMIC LUPUS ERYTHEMATOSUS, UNSPECIFIED SLE TYPE, UNSPECIFIED ORGAN INVOLVEMENT STATUS: ICD-10-CM

## 2019-11-12 DIAGNOSIS — Z79.899 LONG-TERM USE OF PLAQUENIL: ICD-10-CM

## 2019-11-12 LAB
APTT BLDCRRT: 28.6 SEC (ref 21–32)
BASOPHILS # BLD AUTO: 0.04 K/UL (ref 0–0.2)
BASOPHILS # BLD AUTO: 0.04 K/UL (ref 0–0.2)
BASOPHILS NFR BLD: 1.4 % (ref 0–1.9)
BASOPHILS NFR BLD: 1.4 % (ref 0–1.9)
C3 SERPL-MCNC: 105 MG/DL (ref 50–180)
C4 SERPL-MCNC: 33 MG/DL (ref 11–44)
DIFFERENTIAL METHOD: ABNORMAL
DIFFERENTIAL METHOD: ABNORMAL
EOSINOPHIL # BLD AUTO: 0.2 K/UL (ref 0–0.5)
EOSINOPHIL # BLD AUTO: 0.2 K/UL (ref 0–0.5)
EOSINOPHIL NFR BLD: 6.2 % (ref 0–8)
EOSINOPHIL NFR BLD: 6.2 % (ref 0–8)
ERYTHROCYTE [DISTWIDTH] IN BLOOD BY AUTOMATED COUNT: 12 % (ref 11.5–14.5)
ERYTHROCYTE [DISTWIDTH] IN BLOOD BY AUTOMATED COUNT: 12 % (ref 11.5–14.5)
HCT VFR BLD AUTO: 36.5 % (ref 37–48.5)
HCT VFR BLD AUTO: 36.5 % (ref 37–48.5)
HGB BLD-MCNC: 12.4 G/DL (ref 12–16)
HGB BLD-MCNC: 12.4 G/DL (ref 12–16)
IMM GRANULOCYTES # BLD AUTO: 0 K/UL (ref 0–0.04)
IMM GRANULOCYTES # BLD AUTO: 0 K/UL (ref 0–0.04)
IMM GRANULOCYTES NFR BLD AUTO: 0 % (ref 0–0.5)
IMM GRANULOCYTES NFR BLD AUTO: 0 % (ref 0–0.5)
LYMPHOCYTES # BLD AUTO: 0.9 K/UL (ref 1–4.8)
LYMPHOCYTES # BLD AUTO: 0.9 K/UL (ref 1–4.8)
LYMPHOCYTES NFR BLD: 31.8 % (ref 18–48)
LYMPHOCYTES NFR BLD: 31.8 % (ref 18–48)
MCH RBC QN AUTO: 32.4 PG (ref 27–31)
MCH RBC QN AUTO: 32.4 PG (ref 27–31)
MCHC RBC AUTO-ENTMCNC: 34 G/DL (ref 32–36)
MCHC RBC AUTO-ENTMCNC: 34 G/DL (ref 32–36)
MCV RBC AUTO: 95 FL (ref 82–98)
MCV RBC AUTO: 95 FL (ref 82–98)
MONOCYTES # BLD AUTO: 0.3 K/UL (ref 0.3–1)
MONOCYTES # BLD AUTO: 0.3 K/UL (ref 0.3–1)
MONOCYTES NFR BLD: 11.3 % (ref 4–15)
MONOCYTES NFR BLD: 11.3 % (ref 4–15)
NEUTROPHILS # BLD AUTO: 1.4 K/UL (ref 1.8–7.7)
NEUTROPHILS # BLD AUTO: 1.4 K/UL (ref 1.8–7.7)
NEUTROPHILS NFR BLD: 49.3 % (ref 38–73)
NEUTROPHILS NFR BLD: 49.3 % (ref 38–73)
NRBC BLD-RTO: 0 /100 WBC
NRBC BLD-RTO: 0 /100 WBC
PLATELET # BLD AUTO: 159 K/UL (ref 150–350)
PLATELET # BLD AUTO: 159 K/UL (ref 150–350)
PMV BLD AUTO: 10.6 FL (ref 9.2–12.9)
PMV BLD AUTO: 10.6 FL (ref 9.2–12.9)
RBC # BLD AUTO: 3.83 M/UL (ref 4–5.4)
RBC # BLD AUTO: 3.83 M/UL (ref 4–5.4)
WBC # BLD AUTO: 2.92 K/UL (ref 3.9–12.7)
WBC # BLD AUTO: 2.92 K/UL (ref 3.9–12.7)

## 2019-11-12 PROCEDURE — 85025 COMPLETE CBC W/AUTO DIFF WBC: CPT

## 2019-11-12 PROCEDURE — 85730 THROMBOPLASTIN TIME PARTIAL: CPT

## 2019-11-12 PROCEDURE — 99214 PR OFFICE/OUTPT VISIT, EST, LEVL IV, 30-39 MIN: ICD-10-PCS | Mod: S$GLB,,, | Performed by: INTERNAL MEDICINE

## 2019-11-12 PROCEDURE — 86225 DNA ANTIBODY NATIVE: CPT

## 2019-11-12 PROCEDURE — 3008F BODY MASS INDEX DOCD: CPT | Mod: CPTII,S$GLB,, | Performed by: INTERNAL MEDICINE

## 2019-11-12 PROCEDURE — 99999 PR PBB SHADOW E&M-EST. PATIENT-LVL III: ICD-10-PCS | Mod: PBBFAC,,, | Performed by: INTERNAL MEDICINE

## 2019-11-12 PROCEDURE — 86160 COMPLEMENT ANTIGEN: CPT

## 2019-11-12 PROCEDURE — 3008F PR BODY MASS INDEX (BMI) DOCUMENTED: ICD-10-PCS | Mod: CPTII,S$GLB,, | Performed by: INTERNAL MEDICINE

## 2019-11-12 PROCEDURE — 86160 COMPLEMENT ANTIGEN: CPT | Mod: 59

## 2019-11-12 PROCEDURE — 36415 COLL VENOUS BLD VENIPUNCTURE: CPT

## 2019-11-12 PROCEDURE — 99999 PR PBB SHADOW E&M-EST. PATIENT-LVL III: CPT | Mod: PBBFAC,,, | Performed by: INTERNAL MEDICINE

## 2019-11-12 PROCEDURE — 99214 OFFICE O/P EST MOD 30 MIN: CPT | Mod: S$GLB,,, | Performed by: INTERNAL MEDICINE

## 2019-11-12 RX ORDER — HYDROXYCHLOROQUINE SULFATE 200 MG/1
200 TABLET, FILM COATED ORAL 2 TIMES DAILY
Qty: 60 TABLET | Refills: 6 | Status: SHIPPED | OUTPATIENT
Start: 2019-11-12 | End: 2020-10-19

## 2019-11-12 NOTE — PROGRESS NOTES
Subjective:      Patient ID: Massiel Weston is a 34 y.o. female.    Chief Complaint: Pain    HPI   35 yo F with PMH of Hashimotos disease ( 2008), hypoglycemia, seizures here for pain. In summer 2014, she started to have to have fatigue. She also would have swelling of hands in morning.  She feels sore all over. She feels hazy.  Reports hair loss.  Reports pain in hands, wrists, and knees.  She has stiffness in morning for 2-3 hours.  Pain is 5/10.   Nothing improves the pain. Being active makes pain worse. She is on Savella for 2 months.Pain is achy and non-radiating.  She has photosensitivity over last several years. Reports painful ulcers. Hand fingers get swollen. Reports passing out in the past maybe from hypoglycemia.  Thinks she may have Raynauds. NO blood clots. Denies any miscarriages.  Reports poor sleep. No snoring.     Interval history:  . Denies any rashes, oral ulcers, fevers, or raynauds.She has been having easy bruising over the last several months. She had ear infection several weeks ago .  She reports she has mild hand pain. Pain level is 2/10, aching non radiating.  Reports diffuse body stiffness in morning for an hour. Denies any rashes.She continues plaquenil 200mg po BID.  Reports that her fatigue is better.        Grandmother- SLE    Review of Systems :    Constitutional: Positive for fatigue. Negative for chills, diaphoresis, activity change and appetite change.   HENT: Negative for congestion, ear discharge, ear pain, facial swelling, mouth sores, sinus pressure, sneezing, sore throat, tinnitus and trouble swallowing.    Eyes: Negative for photophobia, pain, discharge, redness, itching and visual disturbance.   Respiratory: Negative for apnea, chest tightness, shortness of breath, wheezing and stridor.    Cardiovascular: Negative for leg swelling.   Gastrointestinal: Negative for nausea, abdominal pain, diarrhea, constipation, blood in stool, abdominal distention and anal bleeding.    Endocrine: Negative for cold intolerance and heat intolerance.   Genitourinary: Negative for dysuria and difficulty urinating.   Musculoskeletal: Positive for myalgias, joint swelling, arthralgias, neck pain and neck stiffness. Negative for back pain and gait problem.   Skin: Negative for color change, pallor, rash and wound.   Neurological: Negative for dizziness, seizures, light-headedness and numbness.   Hematological: Negative for adenopathy. Does not bruise/bleed easily.   Psychiatric/Behavioral: Negative for sleep disturbance. The patient is not nervous/anxious.        Objective:   Physical Exam   Vitals reviewed.  Constitutional: She is oriented to person, place, and time.   HENT:   Head: Normocephalic and atraumatic.   Right Ear: External ear normal.   Left Ear: External ear normal.   Nose: Nose normal.   Mouth/Throat: Oropharynx is clear and moist. No oropharyngeal exudate.   Eyes: Conjunctivae and EOM are normal. Pupils are equal, round, and reactive to light. Right eye exhibits no discharge. Left eye exhibits no discharge. No scleral icterus.   Neck: Neck supple. No JVD present. No thyromegaly present.   Cardiovascular: Normal rate, regular rhythm, normal heart sounds and intact distal pulses.  Exam reveals no gallop and no friction rub.    No murmur heard.  Pulmonary/Chest: Effort normal and breath sounds normal. No respiratory distress. She has no wheezes. She has no rales. She exhibits no tenderness.   Abdominal: Soft. Bowel sounds are normal. She exhibits no distension and no mass. There is no tenderness. There is no rebound and no guarding.   Lymphadenopathy:     She has no cervical adenopathy.   Neurological: She is alert and oriented to person, place, and time. No cranial nerve deficit. Gait normal. Coordination normal.   Skin: Skin is dry. No rash noted. No erythema. No pallor.     Psychiatric: Affect and judgment normal.   Musculoskeletal: She exhibits no edema or tenderness.   FROM of all  joints including neck, shoulders, spine, ankles, wrists, knees, and ankles; no joint deformities noted or effusions, erythema or warmth; no tophi or nodules noted; no crepitus; no synovitis noted in hands or feet; no nail pitting or onycholysis        Outside labs:  Orly-1:640  dna-11  Ro,la-negative  rf-negative  Barboza, rnp-negative    Assessment:     38 yo F with PMH of Hashimotos disease ( 2008), hypoglycemia, seizures, complete hysterectomy due to fibroids, here for follow up of SLE. Patient with +ORLY, DNA, photosensitivity, Raynaud's, arthritis, and lymphopenia. Her DNA is negative here but was positive outside.   Her arthritis and myalgias have improved with decreasing processed foods.   Plan:       -continue plaquenil 200mg po BID    -labs today     rtc in 3 months

## 2019-11-13 LAB — DSDNA AB SER-ACNC: NORMAL [IU]/ML

## 2019-11-18 ENCOUNTER — PATIENT MESSAGE (OUTPATIENT)
Dept: RHEUMATOLOGY | Facility: CLINIC | Age: 39
End: 2019-11-18

## 2019-11-20 ENCOUNTER — TELEPHONE (OUTPATIENT)
Dept: RHEUMATOLOGY | Facility: CLINIC | Age: 39
End: 2019-11-20

## 2019-11-20 ENCOUNTER — TELEPHONE (OUTPATIENT)
Dept: HEMATOLOGY/ONCOLOGY | Facility: CLINIC | Age: 39
End: 2019-11-20

## 2019-11-20 DIAGNOSIS — T14.8XXA BRUISING: Primary | ICD-10-CM

## 2019-11-20 NOTE — TELEPHONE ENCOUNTER
Spoke with patient,s he has her appointment the the hematologist and I told her don't take anymore OTC per Dr Meyer

## 2019-12-03 ENCOUNTER — INITIAL CONSULT (OUTPATIENT)
Dept: HEMATOLOGY/ONCOLOGY | Facility: CLINIC | Age: 39
End: 2019-12-03
Payer: COMMERCIAL

## 2019-12-03 ENCOUNTER — LAB VISIT (OUTPATIENT)
Dept: LAB | Facility: HOSPITAL | Age: 39
End: 2019-12-03
Payer: COMMERCIAL

## 2019-12-03 VITALS
HEART RATE: 85 BPM | BODY MASS INDEX: 24.39 KG/M2 | RESPIRATION RATE: 16 BRPM | OXYGEN SATURATION: 100 % | DIASTOLIC BLOOD PRESSURE: 65 MMHG | TEMPERATURE: 98 F | SYSTOLIC BLOOD PRESSURE: 109 MMHG | WEIGHT: 129.19 LBS | HEIGHT: 61 IN

## 2019-12-03 DIAGNOSIS — R23.3 ABNORMAL BRUISING: ICD-10-CM

## 2019-12-03 DIAGNOSIS — D72.819 LEUKOPENIA, UNSPECIFIED TYPE: ICD-10-CM

## 2019-12-03 DIAGNOSIS — R23.3 ABNORMAL BRUISING: Primary | ICD-10-CM

## 2019-12-03 LAB
FIBRINOGEN PPP-MCNC: 368 MG/DL (ref 182–366)
INR PPP: 1 (ref 0.8–1.2)
PROTHROMBIN TIME: 10.6 SEC (ref 9–12.5)

## 2019-12-03 PROCEDURE — 99999 PR PBB SHADOW E&M-EST. PATIENT-LVL III: CPT | Mod: PBBFAC,,,

## 2019-12-03 PROCEDURE — 85384 FIBRINOGEN ACTIVITY: CPT

## 2019-12-03 PROCEDURE — 3008F BODY MASS INDEX DOCD: CPT | Mod: CPTII,S$GLB,,

## 2019-12-03 PROCEDURE — 85240 CLOT FACTOR VIII AHG 1 STAGE: CPT

## 2019-12-03 PROCEDURE — 85246 CLOT FACTOR VIII VW ANTIGEN: CPT

## 2019-12-03 PROCEDURE — 3008F PR BODY MASS INDEX (BMI) DOCUMENTED: ICD-10-PCS | Mod: CPTII,S$GLB,,

## 2019-12-03 PROCEDURE — 85610 PROTHROMBIN TIME: CPT

## 2019-12-03 PROCEDURE — 99204 PR OFFICE/OUTPT VISIT, NEW, LEVL IV, 45-59 MIN: ICD-10-PCS | Mod: S$GLB,,,

## 2019-12-03 PROCEDURE — 99204 OFFICE O/P NEW MOD 45 MIN: CPT | Mod: S$GLB,,,

## 2019-12-03 PROCEDURE — 36415 COLL VENOUS BLD VENIPUNCTURE: CPT

## 2019-12-03 PROCEDURE — 99999 PR PBB SHADOW E&M-EST. PATIENT-LVL III: ICD-10-PCS | Mod: PBBFAC,,,

## 2019-12-03 RX ORDER — METHYLPREDNISOLONE 4 MG/1
TABLET ORAL
Refills: 0 | COMMUNITY
Start: 2019-10-21 | End: 2019-12-03

## 2019-12-03 RX ORDER — BUTALBITAL, ACETAMINOPHEN AND CAFFEINE 50; 325; 40 MG/1; MG/1; MG/1
1 TABLET ORAL EVERY 6 HOURS PRN
Refills: 0 | COMMUNITY
Start: 2019-10-21 | End: 2020-03-10

## 2019-12-03 NOTE — PROGRESS NOTES
SLE well controlled on plaquenil   Long standing autoimmune leukopenia; monitor  Bruising with no worrisome clinical quality; heavy menses but had hysterectomy years ago; some gingival bleeding with brushing  Thinks there may have been bleeding disorder in other family member but not sure of name; she will call family members to further elucidate and message us back  Coags, fibrinogen, VWD panel; +/- plt agg/functional assay studies pending what family condition she has   meds reivewed no clear culprits  Call pt with results

## 2019-12-04 ENCOUNTER — PATIENT MESSAGE (OUTPATIENT)
Dept: HEMATOLOGY/ONCOLOGY | Facility: CLINIC | Age: 39
End: 2019-12-04

## 2019-12-04 NOTE — PROGRESS NOTES
[facility]  [facility]  Hematology/Medical Oncology Clinic      PATIENT: Massiel Weston  MRN: [id number]  DATE: 12/3/2019    Diagnosis:   1. Abnormal bruising    2. Leukopenia, unspecified type      Chief Complaint: Follow-up    Other MDs:  Alethea Phillips NP    Subjective:   Initial History: Ms. Weston is a 39 y.o. female who presents to clinic as a consult for abnormal bruising.     She has a previous medical history of SLE, AI thyroiditis, raynauds and leukopenia.    She is taking plaquenil every day.     She complains of long standing history of easy bruising that has gotten worse recently. States that even the slightest touch could cause her to bruise for multiple days. She also report slight mucosal bleeding and heavy menses in the past.      She is s/p hysterectomy, appendectomy and cholecystectomy without need for blood products.     Says she may have had a grandfather and/or a cousins that have had a bleeding disorder though she is unable of the actual name of the diagnosis.     She denies OTC medications, NSAIDS, supplements, ginkgo, etc.     Has had some steroids recently for URI-like symptoms that she has noticed increase bruising since taking them.     Has children that have had surgeries without need for blood products.     No hx of VTE in herself or family.    She appears well today without much bruising.     Denies blood in stool, urine. Has never coughed up blood. No hx of SSRI use.    PTT was done prior to consult as was normal.     Past Medical History:   Diagnosis Date    Adjustment insomnia     Adult ADHD (attention deficit hyperactivity disorder)     Asthma     as child    Bulimia nervosa in remission     Cyst of right kidney     Hashimoto's thyroiditis 01/2010    Hormone replacement therapy 9/10/2019    [employer]    Hyperreflexia     Hypoglycemic disorder     IC (interstitial cystitis)     Leukopenia     Nausea & vomiting     Other forms of  systemic lupus erythematosus 8/1/2015    Partial complex seizure disorder without intractable epilepsy 2001, 2006    during pregnancy    PCOS (polycystic ovarian syndrome)     Prediabetes     Raynaud disease     Scoliosis of thoracic region due to degenerative disease of spine in adult     Sinus tachycardia 09/2018    SLE (systemic lupus erythematosus) 08/2015    Stress fracture, left foot, initial encounter for fracture 08/2017    no surgery, casted     Past Surgical History:   Procedure Laterality Date    ABLATION OF ENDOMETRIUM USING RESECTOSCOPE  2006    Yusuf    APPENDECTOMY      CYSTOSCOPY WITH URETHRAL DILATION  03/2018    Jeronimo    IMPLANT INTERSTITIAL RADIUM BRACHYTHERAPY INTO TONGUE      [facility] 8/2016 KASSANDRA Estrella    LAPAROSCOPIC CHOLECYSTECTOMY N/A 5/2/2019    Procedure: CHOLECYSTECTOMY, LAPAROSCOPIC;  Surgeon: Ryan Corona MD;  Location: [facility];  Service: General;  Laterality: N/A;    OOPHORECTOMY Left 08/2015    Yusuf    TOTAL ABDOMINAL HYSTERECTOMY W/ BILATERAL SALPINGOOPHORECTOMY  2010    Yusuf, kept left ovary    tubal ligation       Family History   Problem Relation Age of Onset    Hypertension Mother     Diabetes Father     COPD Father     Hyperlipidemia Father     Heart disease Father       reports that she has never smoked. She has never used smokeless tobacco. She reports that she does not drink alcohol or use drugs.  Review of patient's allergies indicates:   Allergen Reactions    Demerol [meperidine] Swelling and Rash    Percocet [oxycodone-acetaminophen] Swelling and Rash     Current Outpatient Medications   Medication Sig Dispense Refill    butalbital-acetaminophen-caffeine -40 mg (FIORICET, ESGIC) -40 mg per tablet Take 1 tablet by mouth every 6 (six) hours as needed.  0    ELMIRON 100 mg Cap Take 100 mg by mouth 2 (two) times daily.       eszopiclone (LUNESTA) 3 mg Tab TAKE ONE TABLET BY MOUTH EVERY EVENING AT BEDTIME 30 tablet 5     "hydroxychloroquine (PLAQUENIL) 200 mg tablet Take 1 tablet (200 mg total) by mouth 2 (two) times daily. 60 tablet 6     No current facility-administered medications for this visit.      Review of Systems   Constitutional: Negative for appetite change, chills, fatigue and unexpected weight change.   HENT: Negative for dental problem, mouth sores, nosebleeds, trouble swallowing and voice change.    Eyes: Negative for visual disturbance.   Respiratory: Negative for chest tightness and shortness of breath.    Cardiovascular: Negative for chest pain, palpitations and leg swelling.   Gastrointestinal: Negative for abdominal distention, abdominal pain, blood in stool, diarrhea, nausea and vomiting.   Endocrine: Negative for cold intolerance.   Genitourinary: Negative for hematuria.   Musculoskeletal: Negative for neck pain.   Skin: Negative for pallor and rash.   Allergic/Immunologic: Negative for immunocompromised state.   Neurological: Negative for dizziness, weakness and headaches.   Hematological: Negative for adenopathy. Bruises/bleeds easily.   Psychiatric/Behavioral: Negative for agitation and behavioral problems.     ECOG Performance Status: 0    Objective:      Vitals:   Vitals:    12/03/19 1607   BP: 109/65   BP Location: Right arm   Patient Position: Sitting   BP Method: Large (Automatic)   Pulse: 85   Resp: 16   Temp: 98 °F (36.7 °C)   TempSrc: Oral   SpO2: 100%   Weight: 58.6 kg (129 lb 3 oz)   Height: 5' 1" (1.549 m)     BMI: Body mass index is 24.41 kg/m².    Physical Exam   Constitutional: She is oriented to person, place, and time. She appears well-developed and well-nourished.   HENT:   Head: Normocephalic and atraumatic.   Eyes: Pupils are equal, round, and reactive to light. EOM are normal.   Neck: Normal range of motion. Neck supple.   Cardiovascular: Normal rate and regular rhythm.   Pulmonary/Chest: Effort normal and breath sounds normal. She has no wheezes.   Abdominal: Soft. Bowel sounds are " normal. She exhibits no mass.   Musculoskeletal: Normal range of motion. She exhibits no edema.   Lymphadenopathy:        Head (right side): No submandibular, no posterior auricular and no occipital adenopathy present.        Head (left side): No submandibular, no posterior auricular and no occipital adenopathy present.     She has no cervical adenopathy.     She has no axillary adenopathy.        Right: No inguinal and no supraclavicular adenopathy present.        Left: No inguinal and no supraclavicular adenopathy present.   Neurological: She is alert and oriented to person, place, and time.   Skin: Skin is warm and dry.   No obvious areas of extensive bruising.    Psychiatric: She has a normal mood and affect. Her behavior is normal.   Vitals reviewed.      Laboratory Data:  Lab Visit on 12/03/2019   Component Date Value Ref Range Status    Fibrinogen 12/03/2019 368* 182 - 366 mg/dL Final    Prothrombin Time 12/03/2019 10.6  9.0 - 12.5 sec Final    INR 12/03/2019 1.0  0.8 - 1.2 Final    Comment: Coumadin Therapy:  2.0 - 3.0 for INR for all indicators except mechanical heart valves  and antiphospholipid syndromes which should use 2.5 - 3.5.       Assessment:       1. Abnormal bruising    2. Leukopenia, unspecified type      Hematologic History:      See below    Plan:     Mrs. Weston is here today for her history of easy bruising that appears to be chronic though reports has maybe gradually become more worrisome over the past few weeks. With her history of heavy menses and mucosal bleeding, something like a mild vWf disorder can be possible though certainly not going to be significant as she has had 2-3 major surgeries in the past that have not required blood products or extensive bleeding. Her children also appear to be okay. We will test her for vWF disease nd platelet times today and if they are normal, it is not going to be something that needs to be worried about going forward.     Regarding her  "leukopenia, it appears this is long standing and she reports that she remember being told as a kid that her WBCs are low. Going back in the EMR it appears her WBC are low/stable. Likely 2/2 to her underlying AI disease + Plaquenil.     -vwf profile   -PT/INR  -see if she can find the name of the "disorder" her grandfather may have had and relay that back to us to see if it is a testable disease  -cbc 6-12 months to monitor counts   -if work up is negative, can report results over the phone and f/u with PCP   -if positive, will see back in a couple weeks to discuss results and plan of care    Discussed with Dr. Calles.    Tanmay Jones MD  Hematology/Medical Oncology Fellow  [phone] (pager)    "

## 2019-12-05 LAB
FACT VIII ACT/NOR PPP: 88 % (ref 55–200)
VWF AG ACT/NOR PPP IA: 109 % (ref 55–200)
VWF:AC ACT/NOR PPP IA: 72 % (ref 55–200)

## 2019-12-09 ENCOUNTER — PATIENT MESSAGE (OUTPATIENT)
Dept: HEMATOLOGY/ONCOLOGY | Facility: CLINIC | Age: 39
End: 2019-12-09

## 2020-03-10 PROBLEM — N30.10 IC (INTERSTITIAL CYSTITIS): Chronic | Status: ACTIVE | Noted: 2020-03-10

## 2020-03-10 PROBLEM — Z79.899 LONG-TERM USE OF PLAQUENIL: Status: ACTIVE | Noted: 2020-03-10

## 2020-03-10 PROBLEM — F15.90 OTHER STIMULANT USE, UNSPECIFIED, UNCOMPLICATED: Status: ACTIVE | Noted: 2020-03-10

## 2020-07-28 ENCOUNTER — OFFICE VISIT (OUTPATIENT)
Dept: RHEUMATOLOGY | Facility: CLINIC | Age: 40
End: 2020-07-28
Payer: COMMERCIAL

## 2020-07-28 DIAGNOSIS — M32.9 SYSTEMIC LUPUS ERYTHEMATOSUS, UNSPECIFIED SLE TYPE, UNSPECIFIED ORGAN INVOLVEMENT STATUS: Primary | ICD-10-CM

## 2020-07-28 DIAGNOSIS — Z79.899 LONG-TERM USE OF PLAQUENIL: ICD-10-CM

## 2020-07-28 PROCEDURE — 99214 PR OFFICE/OUTPT VISIT, EST, LEVL IV, 30-39 MIN: ICD-10-PCS | Mod: 95,,, | Performed by: INTERNAL MEDICINE

## 2020-07-28 PROCEDURE — 99214 OFFICE O/P EST MOD 30 MIN: CPT | Mod: 95,,, | Performed by: INTERNAL MEDICINE

## 2020-07-28 ASSESSMENT — ROUTINE ASSESSMENT OF PATIENT INDEX DATA (RAPID3)
FATIGUE SCORE: 0
TOTAL RAPID3 SCORE: 1.33
WHEN YOU AWAKENED IN THE MORNING OVER THE LAST WEEK, PLEASE INDICATE THE AMOUNT OF TIME IT TAKES UNTIL YOU ARE AS LIMBER AS YOU WILL BE FOR THE DAY: 1 HOUR
AM STIFFNESS SCORE: 1, YES
PSYCHOLOGICAL DISTRESS SCORE: 1.1
MDHAQ FUNCTION SCORE: 0
PATIENT GLOBAL ASSESSMENT SCORE: 1
PAIN SCORE: 3

## 2020-07-28 NOTE — PROGRESS NOTES
Subjective:      Patient ID: Massiel Weston is a 34 y.o. female.    Chief Complaint: Pain    HPI   35 yo F with PMH of Hashimotos disease ( 2008), hypoglycemia, seizures here for pain. In summer 2014, she started to have to have fatigue. She also would have swelling of hands in morning.  She feels sore all over. She feels hazy.  Reports hair loss.  Reports pain in hands, wrists, and knees.  She has stiffness in morning for 2-3 hours.  Pain is 5/10.   Nothing improves the pain. Being active makes pain worse. She is on Savella for 2 months.Pain is achy and non-radiating.  She has photosensitivity over last several years. Reports painful ulcers. Hand fingers get swollen. Reports passing out in the past maybe from hypoglycemia.  Thinks she may have Raynauds. NO blood clots. Denies any miscarriages.  Reports poor sleep. No snoring.     Interval history:  . Denies any rashes, oral ulcers, fevers, or raynauds. Reports improving in bruising.  She reports she has mild hand pain. Pain level is 2/10, aching non radiating.  Reports diffuse body stiffness in morning for an hour. Denies any rashes.She continues plaquenil 200mg po BID.  Reports that her fatigue is better.        Grandmother- SLE    Review of Systems :    Constitutional: Positive for fatigue. Negative for chills, diaphoresis, activity change and appetite change.   HENT: Negative for congestion, ear discharge, ear pain, facial swelling, mouth sores, sinus pressure, sneezing, sore throat, tinnitus and trouble swallowing.    Eyes: Negative for photophobia, pain, discharge, redness, itching and visual disturbance.   Respiratory: Negative for apnea, chest tightness, shortness of breath, wheezing and stridor.    Cardiovascular: Negative for leg swelling.   Gastrointestinal: Negative for nausea, abdominal pain, diarrhea, constipation, blood in stool, abdominal distention and anal bleeding.   Endocrine: Negative for cold intolerance and heat intolerance.    Genitourinary: Negative for dysuria and difficulty urinating.   Musculoskeletal: Positive for myalgias, joint swelling, arthralgias, neck pain and neck stiffness. Negative for back pain and gait problem.   Skin: Negative for color change, pallor, rash and wound.   Neurological: Negative for dizziness, seizures, light-headedness and numbness.   Hematological: Negative for adenopathy. Does not bruise/bleed easily.   Psychiatric/Behavioral: Negative for sleep disturbance. The patient is not nervous/anxious.        Objective:   Physical Exam   Vitals reviewed.  Constitutional: She is oriented to person, place, and time.   HENT:   Head: Normocephalic and atraumatic.   Right Ear: External ear normal.   Left Ear: External ear normal.   Nose: Nose normal.   Mouth/Throat: Oropharynx is clear and moist. No oropharyngeal exudate.   Eyes: Conjunctivae and EOM are normal. Pupils are equal, round, and reactive to light. Right eye exhibits no discharge. Left eye exhibits no discharge. No scleral icterus.   Neck: Neck supple. No JVD present. No thyromegaly present.   Cardiovascular: Normal rate, regular rhythm, normal heart sounds and intact distal pulses.  Exam reveals no gallop and no friction rub.    No murmur heard.  Pulmonary/Chest: Effort normal and breath sounds normal. No respiratory distress. She has no wheezes. She has no rales. She exhibits no tenderness.   Abdominal: Soft. Bowel sounds are normal. She exhibits no distension and no mass. There is no tenderness. There is no rebound and no guarding.   Lymphadenopathy:     She has no cervical adenopathy.   Neurological: She is alert and oriented to person, place, and time. No cranial nerve deficit. Gait normal. Coordination normal.   Skin: Skin is dry. No rash noted. No erythema. No pallor.     Psychiatric: Affect and judgment normal.   Musculoskeletal: She exhibits no edema or tenderness.   FROM of all joints including neck, shoulders, spine, ankles, wrists, knees, and  ankles; no joint deformities noted or effusions, erythema or warmth; no tophi or nodules noted; no crepitus; no synovitis noted in hands or feet; no nail pitting or onycholysis        Outside labs:  Orly-1:640  dna-11  Ro,la-negative  rf-negative  Barboza, rnp-negative    Assessment:   41 yo F with PMH of Hashimotos disease ( 2008), hypoglycemia, seizures, complete hysterectomy due to fibroids, here for follow up of SLE. Patient with +ORLY, DNA, photosensitivity, Raynaud's, arthritis, and lymphopenia. Her DNA is negative here but was positive outside.   Her arthritis and myalgias have improved with decreasing processed foods.   Plan:       -continue plaquenil 200mg po BID  (has upcoming eye exam)    -labs at [facility] (next week)  [facility] in 6 months

## 2020-07-28 NOTE — PROGRESS NOTES
Rapid3 Question Responses and Scores 7/27/2020   MDHAQ Score 0   Psychologic Score 1.1   Pain Score 3   When you awakened in the morning OVER THE LAST WEEK, did you feel stiff? Yes   If Yes, please indicate the number of hours until you are as limber as you will be for the day 1   Fatigue Score 1   Global Health Score 0   RAPID3 Score 1

## 2020-07-28 NOTE — PROGRESS NOTES
Subjective:      Patient ID: Massiel Weston is a 34 y.o. female.    Chief Complaint: Pain    HPI   35 yo F with PMH of Hashimotos disease ( 2008), hypoglycemia, seizures here for pain. In summer 2014, she started to have to have fatigue. She also would have swelling of hands in morning.  She feels sore all over. She feels hazy.  Reports hair loss.  Reports pain in hands, wrists, and knees.  She has stiffness in morning for 2-3 hours.  Pain is 5/10.   Nothing improves the pain. Being active makes pain worse. She is on Savella for 2 months.Pain is achy and non-radiating.  She has photosensitivity over last several years. Reports painful ulcers. Hand fingers get swollen. Reports passing out in the past maybe from hypoglycemia.  Thinks she may have Raynauds. NO blood clots. Denies any miscarriages.  Reports poor sleep. No snoring.     Interval history:  . Denies any rashes, oral ulcers, fevers, or raynauds.She reports she has mild hand pain. Pain level is 2/10, aching non radiating.  Reports diffuse body stiffness in morning for an hour. Denies any rashes.She continues plaquenil 200mg po BID.  Reports that her fatigue is better.        Grandmother- SLE    Review of Systems :    Constitutional: Positive for fatigue. Negative for chills, diaphoresis, activity change and appetite change.   HENT: Negative for congestion, ear discharge, ear pain, facial swelling, mouth sores, sinus pressure, sneezing, sore throat, tinnitus and trouble swallowing.    Eyes: Negative for photophobia, pain, discharge, redness, itching and visual disturbance.   Respiratory: Negative for apnea, chest tightness, shortness of breath, wheezing and stridor.    Cardiovascular: Negative for leg swelling.   Gastrointestinal: Negative for nausea, abdominal pain, diarrhea, constipation, blood in stool, abdominal distention and anal bleeding.   Endocrine: Negative for cold intolerance and heat intolerance.   Genitourinary: Negative for dysuria and  difficulty urinating.   Musculoskeletal: Positive for myalgias, joint swelling, arthralgias, neck pain and neck stiffness. Negative for back pain and gait problem.   Skin: Negative for color change, pallor, rash and wound.   Neurological: Negative for dizziness, seizures, light-headedness and numbness.   Hematological: Negative for adenopathy. Does not bruise/bleed easily.   Psychiatric/Behavioral: Negative for sleep disturbance. The patient is not nervous/anxious.        Objective:   Physical Exam   Vitals reviewed.  Constitutional: She is oriented to person, place, and time.   HENT:   Head: Normocephalic and atraumatic.   Right Ear: External ear normal.   Left Ear: External ear normal.   Nose: Nose normal.   Mouth/Throat: Oropharynx is clear and moist. No oropharyngeal exudate.   Eyes: Conjunctivae and EOM are normal. Pupils are equal, round, and reactive to light. Right eye exhibits no discharge. Left eye exhibits no discharge. No scleral icterus.   Neck: Neck supple. No JVD present. No thyromegaly present.   Cardiovascular: Normal rate, regular rhythm, normal heart sounds and intact distal pulses.  Exam reveals no gallop and no friction rub.    No murmur heard.  Pulmonary/Chest: Effort normal and breath sounds normal. No respiratory distress. She has no wheezes. She has no rales. She exhibits no tenderness.   Abdominal: Soft. Bowel sounds are normal. She exhibits no distension and no mass. There is no tenderness. There is no rebound and no guarding.   Lymphadenopathy:     She has no cervical adenopathy.   Neurological: She is alert and oriented to person, place, and time. No cranial nerve deficit. Gait normal. Coordination normal.   Skin: Skin is dry. No rash noted. No erythema. No pallor.     Psychiatric: Affect and judgment normal.   Musculoskeletal: She exhibits no edema or tenderness.   FROM of all joints including neck, shoulders, spine, ankles, wrists, knees, and ankles; no joint deformities noted or  effusions, erythema or warmth; no tophi or nodules noted; no crepitus; no synovitis noted in hands or feet; no nail pitting or onycholysis        Outside labs:  Orly-1:640  dna-11  Ro,la-negative  rf-negative  Barboza, rnp-negative    Assessment:    39 yo F with PMH of Hashimotos disease ( 2008), hypoglycemia, seizures, complete hysterectomy due to fibroids, here for follow up of SLE. Patient with +ORLY, DNA, photosensitivity, Raynaud's, arthritis, and lymphopenia. Her DNA is negative here but was positive outside.   Her arthritis and myalgias have improved with decreasing processed foods.   Plan:       -continue plaquenil 200mg po BID (eye exam scheduled)    -labs at [facility] in 6  Months    The patient location is: home  The chief complaint leading to consultation is: joint pain    Visit type: audiovisual    Face to Face time with patient: 30 minutes of total time spent on the encounter, which includes face to face time and non-face to face time preparing to see the patient (eg, review of tests), Obtaining and/or reviewing separately obtained history, Documenting clinical information in the electronic or other health record, Independently interpreting results (not separately reported) and communicating results to the patient/family/caregiver, or Care coordination (not separately reported).         Each patient to whom he or she provides medical services by telemedicine is:  (1) informed of the relationship between the physician and patient and the respective role of any other health care provider with respect to management of the patient; and (2) notified that he or she may decline to receive medical services by telemedicine and may withdraw from such care at any time.    Notes:

## 2020-08-06 LAB
ALBUMIN SERPL-MCNC: 4.1 G/DL (ref 3.6–5.1)
ALBUMIN/GLOB SERPL: 1.7 (CALC) (ref 1–2.5)
ALP SERPL-CCNC: 46 U/L (ref 31–125)
ALT SERPL-CCNC: 18 U/L (ref 6–29)
APPEARANCE UR: CLEAR
AST SERPL-CCNC: 22 U/L (ref 10–30)
BASOPHILS # BLD AUTO: 39 CELLS/UL (ref 0–200)
BASOPHILS NFR BLD AUTO: 1 %
BILIRUB SERPL-MCNC: 0.6 MG/DL (ref 0.2–1.2)
BILIRUB UR QL STRIP: NEGATIVE
BUN SERPL-MCNC: 11 MG/DL (ref 7–25)
BUN/CREAT SERPL: NORMAL (CALC) (ref 6–22)
C3 SERPL-MCNC: 106 MG/DL (ref 83–193)
C4 SERPL-MCNC: 27 MG/DL (ref 15–57)
CALCIUM SERPL-MCNC: 9 MG/DL (ref 8.6–10.2)
CHLORIDE SERPL-SCNC: 101 MMOL/L (ref 98–110)
CO2 SERPL-SCNC: 31 MMOL/L (ref 20–32)
COLOR UR: YELLOW
CREAT SERPL-MCNC: 0.98 MG/DL (ref 0.5–1.1)
CREAT UR-MCNC: 48 MG/DL (ref 20–275)
CRP SERPL-MCNC: 0.3 MG/L
DSDNA AB SER-ACNC: 4 IU/ML
EOSINOPHIL # BLD AUTO: 168 CELLS/UL (ref 15–500)
EOSINOPHIL NFR BLD AUTO: 4.3 %
ERYTHROCYTE [DISTWIDTH] IN BLOOD BY AUTOMATED COUNT: 12.6 % (ref 11–15)
ERYTHROCYTE [SEDIMENTATION RATE] IN BLOOD BY WESTERGREN METHOD: 2 MM/H
GFRSERPLBLD MDRD-ARVRAT: 72 ML/MIN/1.73M2
GLOBULIN SER CALC-MCNC: 2.4 G/DL (CALC) (ref 1.9–3.7)
GLUCOSE SERPL-MCNC: 84 MG/DL (ref 65–99)
GLUCOSE UR QL STRIP: NEGATIVE
HCT VFR BLD AUTO: 39 % (ref 35–45)
HGB BLD-MCNC: 13.4 G/DL (ref 11.7–15.5)
HGB UR QL STRIP: NEGATIVE
KETONES UR QL STRIP: NEGATIVE
LEUKOCYTE ESTERASE UR QL STRIP: NEGATIVE
LYMPHOCYTES # BLD AUTO: 1139 CELLS/UL (ref 850–3900)
LYMPHOCYTES NFR BLD AUTO: 29.2 %
MCH RBC QN AUTO: 32.4 PG (ref 27–33)
MCHC RBC AUTO-ENTMCNC: 34.4 G/DL (ref 32–36)
MCV RBC AUTO: 94.2 FL (ref 80–100)
MONOCYTES # BLD AUTO: 371 CELLS/UL (ref 200–950)
MONOCYTES NFR BLD AUTO: 9.5 %
NEUTROPHILS # BLD AUTO: 2184 CELLS/UL (ref 1500–7800)
NEUTROPHILS NFR BLD AUTO: 56 %
NITRITE UR QL STRIP: NEGATIVE
PH UR STRIP: 5.5 [PH] (ref 5–8)
PLATELET # BLD AUTO: 194 THOUSAND/UL (ref 140–400)
PMV BLD REES-ECKER: 10.3 FL (ref 7.5–12.5)
POTASSIUM SERPL-SCNC: 3.6 MMOL/L (ref 3.5–5.3)
PROT SERPL-MCNC: 6.5 G/DL (ref 6.1–8.1)
PROT UR QL STRIP: NEGATIVE
PROT UR-MCNC: <4 MG/DL (ref 5–24)
PROT/CREAT UR: ABNORMAL MG/G CREAT (ref 21–161)
PROT/CREAT UR: ABNORMAL MG/MG CREAT (ref 0.02–0.16)
RBC # BLD AUTO: 4.14 MILLION/UL (ref 3.8–5.1)
SODIUM SERPL-SCNC: 140 MMOL/L (ref 135–146)
SP GR UR STRIP: 1.01 (ref 1–1.03)
WBC # BLD AUTO: 3.9 THOUSAND/UL (ref 3.8–10.8)

## 2020-09-09 PROBLEM — G44.209 MIXED COMMON MIGRAINE AND MUSCLE CONTRACTION HEADACHE: Status: ACTIVE | Noted: 2020-09-09

## 2020-09-09 PROBLEM — G43.009 MIXED COMMON MIGRAINE AND MUSCLE CONTRACTION HEADACHE: Status: ACTIVE | Noted: 2020-09-09

## 2020-12-01 PROBLEM — N20.1 CALCULUS OF URETER: Status: ACTIVE | Noted: 2020-12-01

## 2020-12-01 PROBLEM — N23 RENAL COLIC: Status: ACTIVE | Noted: 2020-12-01

## 2020-12-01 PROBLEM — R31.0 GROSS HEMATURIA: Status: ACTIVE | Noted: 2020-12-01

## 2021-02-23 ENCOUNTER — PATIENT MESSAGE (OUTPATIENT)
Dept: RHEUMATOLOGY | Facility: CLINIC | Age: 41
End: 2021-02-23

## 2021-03-09 PROBLEM — N39.3 STRESS INCONTINENCE: Status: ACTIVE | Noted: 2021-03-09

## 2021-04-26 PROBLEM — Z91.018 MULTIPLE FOOD ALLERGIES: Status: ACTIVE | Noted: 2021-04-26

## 2021-05-04 ENCOUNTER — PATIENT MESSAGE (OUTPATIENT)
Dept: RESEARCH | Facility: HOSPITAL | Age: 41
End: 2021-05-04

## 2021-10-05 ENCOUNTER — OFFICE VISIT (OUTPATIENT)
Dept: RHEUMATOLOGY | Facility: CLINIC | Age: 41
End: 2021-10-05
Payer: COMMERCIAL

## 2021-10-05 DIAGNOSIS — Z79.899 LONG-TERM USE OF PLAQUENIL: ICD-10-CM

## 2021-10-05 DIAGNOSIS — M32.9 SYSTEMIC LUPUS ERYTHEMATOSUS, UNSPECIFIED SLE TYPE, UNSPECIFIED ORGAN INVOLVEMENT STATUS: Primary | ICD-10-CM

## 2021-10-05 PROCEDURE — 99214 OFFICE O/P EST MOD 30 MIN: CPT | Mod: 95,,, | Performed by: INTERNAL MEDICINE

## 2021-10-05 PROCEDURE — 1159F PR MEDICATION LIST DOCUMENTED IN MEDICAL RECORD: ICD-10-PCS | Mod: CPTII,95,, | Performed by: INTERNAL MEDICINE

## 2021-10-05 PROCEDURE — 1159F MED LIST DOCD IN RCRD: CPT | Mod: CPTII,95,, | Performed by: INTERNAL MEDICINE

## 2021-10-05 PROCEDURE — 3044F PR MOST RECENT HEMOGLOBIN A1C LEVEL <7.0%: ICD-10-PCS | Mod: CPTII,95,, | Performed by: INTERNAL MEDICINE

## 2021-10-05 PROCEDURE — 99214 PR OFFICE/OUTPT VISIT, EST, LEVL IV, 30-39 MIN: ICD-10-PCS | Mod: 95,,, | Performed by: INTERNAL MEDICINE

## 2021-10-05 PROCEDURE — 3044F HG A1C LEVEL LT 7.0%: CPT | Mod: CPTII,95,, | Performed by: INTERNAL MEDICINE

## 2021-10-07 LAB
ALBUMIN SERPL-MCNC: 4.4 G/DL (ref 3.6–5.1)
ALBUMIN/GLOB SERPL: 1.7 (CALC) (ref 1–2.5)
ALP SERPL-CCNC: 63 U/L (ref 31–125)
ALT SERPL-CCNC: 11 U/L (ref 6–29)
APPEARANCE UR: CLEAR
AST SERPL-CCNC: 18 U/L (ref 10–30)
BASOPHILS # BLD AUTO: 80 CELLS/UL (ref 0–200)
BASOPHILS NFR BLD AUTO: 2 %
BILIRUB SERPL-MCNC: 0.6 MG/DL (ref 0.2–1.2)
BILIRUB UR QL STRIP: NEGATIVE
BUN SERPL-MCNC: 12 MG/DL (ref 7–25)
BUN/CREAT SERPL: NORMAL (CALC) (ref 6–22)
C3 SERPL-MCNC: 107 MG/DL (ref 83–193)
C4 SERPL-MCNC: 28 MG/DL (ref 15–57)
CALCIUM SERPL-MCNC: 9.4 MG/DL (ref 8.6–10.2)
CHLORIDE SERPL-SCNC: 98 MMOL/L (ref 98–110)
CO2 SERPL-SCNC: 30 MMOL/L (ref 20–32)
COLOR UR: YELLOW
CREAT SERPL-MCNC: 0.99 MG/DL (ref 0.5–1.1)
CREAT UR-MCNC: 64 MG/DL (ref 20–275)
CRP SERPL-MCNC: 0.4 MG/L
DSDNA AB SER-ACNC: 4 IU/ML
EOSINOPHIL # BLD AUTO: 436 CELLS/UL (ref 15–500)
EOSINOPHIL NFR BLD AUTO: 10.9 %
ERYTHROCYTE [DISTWIDTH] IN BLOOD BY AUTOMATED COUNT: 12.4 % (ref 11–15)
ERYTHROCYTE [SEDIMENTATION RATE] IN BLOOD BY WESTERGREN METHOD: 2 MM/H
GLOBULIN SER CALC-MCNC: 2.6 G/DL (CALC) (ref 1.9–3.7)
GLUCOSE SERPL-MCNC: 77 MG/DL (ref 65–99)
GLUCOSE UR QL STRIP: NEGATIVE
HCT VFR BLD AUTO: 44.1 % (ref 35–45)
HGB BLD-MCNC: 15.1 G/DL (ref 11.7–15.5)
HGB UR QL STRIP: NEGATIVE
KETONES UR QL STRIP: NEGATIVE
LEUKOCYTE ESTERASE UR QL STRIP: NEGATIVE
LYMPHOCYTES # BLD AUTO: 1236 CELLS/UL (ref 850–3900)
LYMPHOCYTES NFR BLD AUTO: 30.9 %
MCH RBC QN AUTO: 31.8 PG (ref 27–33)
MCHC RBC AUTO-ENTMCNC: 34.2 G/DL (ref 32–36)
MCV RBC AUTO: 92.8 FL (ref 80–100)
MONOCYTES # BLD AUTO: 404 CELLS/UL (ref 200–950)
MONOCYTES NFR BLD AUTO: 10.1 %
NEUTROPHILS # BLD AUTO: 1844 CELLS/UL (ref 1500–7800)
NEUTROPHILS NFR BLD AUTO: 46.1 %
NITRITE UR QL STRIP: NEGATIVE
PH UR STRIP: 7.5 [PH] (ref 5–8)
PLATELET # BLD AUTO: 202 THOUSAND/UL (ref 140–400)
PMV BLD REES-ECKER: 10.7 FL (ref 7.5–12.5)
POTASSIUM SERPL-SCNC: 4.2 MMOL/L (ref 3.5–5.3)
PROT SERPL-MCNC: 7 G/DL (ref 6.1–8.1)
PROT UR QL STRIP: NEGATIVE
PROT UR-MCNC: 5 MG/DL (ref 5–24)
PROT/CREAT UR: 0.08 MG/MG CREAT (ref 0.02–0.16)
PROT/CREAT UR: 78 MG/G CREAT (ref 21–161)
RBC # BLD AUTO: 4.75 MILLION/UL (ref 3.8–5.1)
SODIUM SERPL-SCNC: 138 MMOL/L (ref 135–146)
SP GR UR STRIP: 1.01 (ref 1–1.03)
WBC # BLD AUTO: 4 THOUSAND/UL (ref 3.8–10.8)

## 2021-10-09 LAB
ENA SS-A AB SER IA-ACNC: NORMAL AI
ENA SS-B AB SER IA-ACNC: NORMAL AI
GAMMA INTERFERON BACKGROUND BLD IA-ACNC: 0.02 IU/ML
HBV CORE AB SERPL QL IA: NORMAL
HBV SURFACE AG SERPL QL IA: NORMAL
M TB IFN-G BLD-IMP: NEGATIVE
M TB IFN-G CD4+ BCKGRND COR BLD-ACNC: 0 IU/ML
MITOGEN IGNF BCKGRD COR BLD-ACNC: 9.67 IU/ML
TB2 - NIL: 0 IU/ML

## 2021-10-13 ENCOUNTER — PATIENT MESSAGE (OUTPATIENT)
Dept: RHEUMATOLOGY | Facility: CLINIC | Age: 41
End: 2021-10-13
Payer: COMMERCIAL

## 2021-11-17 PROBLEM — D70.9 NEUTROPENIA, UNSPECIFIED TYPE: Status: ACTIVE | Noted: 2021-11-17

## 2021-11-17 PROBLEM — Z71.3 DIETARY COUNSELING: Status: RESOLVED | Noted: 2019-07-10 | Resolved: 2021-11-17

## 2022-01-05 ENCOUNTER — PATIENT MESSAGE (OUTPATIENT)
Dept: ADMINISTRATIVE | Facility: OTHER | Age: 42
End: 2022-01-05
Payer: COMMERCIAL

## 2022-01-05 PROBLEM — U07.1 COVID-19: Status: ACTIVE | Noted: 2022-01-05

## 2022-02-09 ENCOUNTER — PATIENT MESSAGE (OUTPATIENT)
Dept: RHEUMATOLOGY | Facility: CLINIC | Age: 42
End: 2022-02-09
Payer: COMMERCIAL

## 2022-02-18 ENCOUNTER — OFFICE VISIT (OUTPATIENT)
Dept: RHEUMATOLOGY | Facility: CLINIC | Age: 42
End: 2022-02-18
Payer: COMMERCIAL

## 2022-02-18 VITALS
SYSTOLIC BLOOD PRESSURE: 108 MMHG | HEART RATE: 96 BPM | WEIGHT: 149.25 LBS | DIASTOLIC BLOOD PRESSURE: 67 MMHG | BODY MASS INDEX: 25.48 KG/M2 | HEIGHT: 64 IN

## 2022-02-18 DIAGNOSIS — Z79.899 LONG-TERM USE OF PLAQUENIL: ICD-10-CM

## 2022-02-18 DIAGNOSIS — M32.9 SYSTEMIC LUPUS ERYTHEMATOSUS, UNSPECIFIED SLE TYPE, UNSPECIFIED ORGAN INVOLVEMENT STATUS: Primary | ICD-10-CM

## 2022-02-18 PROCEDURE — 3008F PR BODY MASS INDEX (BMI) DOCUMENTED: ICD-10-PCS | Mod: CPTII,S$GLB,, | Performed by: INTERNAL MEDICINE

## 2022-02-18 PROCEDURE — 99215 PR OFFICE/OUTPT VISIT, EST, LEVL V, 40-54 MIN: ICD-10-PCS | Mod: S$GLB,,, | Performed by: INTERNAL MEDICINE

## 2022-02-18 PROCEDURE — 1159F MED LIST DOCD IN RCRD: CPT | Mod: CPTII,S$GLB,, | Performed by: INTERNAL MEDICINE

## 2022-02-18 PROCEDURE — 3074F PR MOST RECENT SYSTOLIC BLOOD PRESSURE < 130 MM HG: ICD-10-PCS | Mod: CPTII,S$GLB,, | Performed by: INTERNAL MEDICINE

## 2022-02-18 PROCEDURE — 1159F PR MEDICATION LIST DOCUMENTED IN MEDICAL RECORD: ICD-10-PCS | Mod: CPTII,S$GLB,, | Performed by: INTERNAL MEDICINE

## 2022-02-18 PROCEDURE — 99999 PR PBB SHADOW E&M-EST. PATIENT-LVL III: ICD-10-PCS | Mod: PBBFAC,,, | Performed by: INTERNAL MEDICINE

## 2022-02-18 PROCEDURE — 3078F DIAST BP <80 MM HG: CPT | Mod: CPTII,S$GLB,, | Performed by: INTERNAL MEDICINE

## 2022-02-18 PROCEDURE — 3074F SYST BP LT 130 MM HG: CPT | Mod: CPTII,S$GLB,, | Performed by: INTERNAL MEDICINE

## 2022-02-18 PROCEDURE — 99999 PR PBB SHADOW E&M-EST. PATIENT-LVL III: CPT | Mod: PBBFAC,,, | Performed by: INTERNAL MEDICINE

## 2022-02-18 PROCEDURE — 3008F BODY MASS INDEX DOCD: CPT | Mod: CPTII,S$GLB,, | Performed by: INTERNAL MEDICINE

## 2022-02-18 PROCEDURE — 99215 OFFICE O/P EST HI 40 MIN: CPT | Mod: S$GLB,,, | Performed by: INTERNAL MEDICINE

## 2022-02-18 PROCEDURE — 3078F PR MOST RECENT DIASTOLIC BLOOD PRESSURE < 80 MM HG: ICD-10-PCS | Mod: CPTII,S$GLB,, | Performed by: INTERNAL MEDICINE

## 2022-02-18 RX ORDER — BACLOFEN 10 MG/1
TABLET ORAL
Qty: 60 TABLET | Refills: 11 | Status: SHIPPED | OUTPATIENT
Start: 2022-02-18 | End: 2022-09-22

## 2022-02-18 RX ORDER — MELOXICAM 15 MG/1
15 TABLET ORAL DAILY PRN
Qty: 30 TABLET | Refills: 11 | Status: SHIPPED | OUTPATIENT
Start: 2022-02-18 | End: 2022-09-22

## 2022-02-18 NOTE — PROGRESS NOTES
Subjective:      Patient ID: Massiel Weston is a 34 y.o. female.    Chief Complaint: Pain    HPI   33 yo F with PMH of Hashimotos disease ( 2008), hypoglycemia, seizures here for pain. In summer 2014, she started to have to have fatigue. She also would have swelling of hands in morning.  She feels sore all over. She feels hazy.  Reports hair loss.  Reports pain in hands, wrists, and knees.  She has stiffness in morning for 2-3 hours.  Pain is 5/10.   Nothing improves the pain. Being active makes pain worse. She is on Savella for 2 months.Pain is achy and non-radiating.  She has photosensitivity over last several years. Reports painful ulcers. Hand fingers get swollen. Reports passing out in the past maybe from hypoglycemia.  Thinks she may have Raynauds. NO blood clots. Denies any miscarriages.  Reports poor sleep. No snoring.     Interval history:   Reports increased stress.She has pain in left flank. Pain level is as high as 5/10.  Sometimes it radiates down.   Reports increased pain in hands, ankles, and elbows.Reports mild swelling in hands.Reports she has increased soreness recently.   Reports stiffness all over. Reports more rashes in chest. Denies hair loss.   She continues plaquenil 200mg po BID.     Grandmother- SLE      Review of Systems :    Constitutional: Positive for fatigue. Negative for chills, diaphoresis, activity change and appetite change.   HENT: Negative for congestion, ear discharge, ear pain, facial swelling, mouth sores, sinus pressure, sneezing, sore throat, tinnitus and trouble swallowing.    Eyes: Negative for photophobia, pain, discharge, redness, itching and visual disturbance.   Respiratory: Negative for apnea, chest tightness, shortness of breath, wheezing and stridor.    Cardiovascular: Negative for leg swelling.   Gastrointestinal: Negative for nausea, abdominal pain, diarrhea, constipation, blood in stool, abdominal distention and anal bleeding.   Endocrine: Negative for  cold intolerance and heat intolerance.   Genitourinary: Negative for dysuria and difficulty urinating.   Musculoskeletal: Positive for myalgias, joint swelling, arthralgias, neck pain and neck stiffness. Negative for back pain and gait problem.   Skin: Negative for color change, pallor, rash and wound.   Neurological: Negative for dizziness, seizures, light-headedness and numbness.   Hematological: Negative for adenopathy. Does not bruise/bleed easily.   Psychiatric/Behavioral: Negative for sleep disturbance. The patient is not nervous/anxious.        Objective:   Physical Exam   Vitals reviewed.  Constitutional: She is oriented to person, place, and time.   HENT:   Head: Normocephalic and atraumatic.   Right Ear: External ear normal.   Left Ear: External ear normal.   Nose: Nose normal.   Mouth/Throat: Oropharynx is clear and moist. No oropharyngeal exudate.   Eyes: Conjunctivae and EOM are normal. Pupils are equal, round, and reactive to light. Right eye exhibits no discharge. Left eye exhibits no discharge. No scleral icterus.   Neck: Neck supple. No JVD present. No thyromegaly present.   Cardiovascular: Normal rate, regular rhythm, normal heart sounds and intact distal pulses.  Exam reveals no gallop and no friction rub.    No murmur heard.  Pulmonary/Chest: Effort normal and breath sounds normal. No respiratory distress. She has no wheezes. She has no rales. She exhibits no tenderness.   Abdominal: Soft. Bowel sounds are normal. She exhibits no distension and no mass. There is no tenderness. There is no rebound and no guarding.   Lymphadenopathy:     She has no cervical adenopathy.   Neurological: She is alert and oriented to person, place, and time. No cranial nerve deficit. Gait normal. Coordination normal.   Skin: Skin is dry. No rash noted. No erythema. No pallor.     Psychiatric: Affect and judgment normal.   Musculoskeletal: She exhibits no edema or tenderness.   FROM of all joints including neck,  shoulders, spine, ankles, wrists, knees, and ankles; no joint deformities noted or effusions, erythema or warmth; no tophi or nodules noted; no crepitus; no synovitis noted in hands or feet; no nail pitting or onycholysis        Outside labs:  Orly-1:640  dna-11  Ro,la-negative  rf-negative  Barboza, rnp-negative    Assessment:    40 yo F with PMH of Hashimotos disease ( 2008), hypoglycemia, seizures,pancreatitis x2, complete hysterectomy due to fibroids, nephrolithiasis, here for follow up of SLE. Patient with +ORLY, DNA, photosensitivity, Raynaud's, arthritis, and lymphopenia. Her DNA is negative here but was positive outside.   She has been dealing with kidney stones recently.  She has pain in left flank area that is reproducible on exam. I told her it could be muscular but if it does not improve with treatment, we may need CT scan of abdomen.  Plan:     -start baclofen 10mg (take 1-2 tablets at bedtime)  -start meloxicam 15 mg po qday for a week and then as needed  -continue plaquenil 200mg po BID (eye exam scheduled)    -labs at [facility] in 3  Months    30 * minutes of total time spent on the encounter, which includes face to face time and non-face to face time preparing to see the patient (eg, review of tests), Obtaining and/or reviewing separately obtained history, Documenting clinical information in the electronic or other health record, Independently interpreting results (not separately reported) and communicating results to the patient/family/caregiver, or Care coordination (not separately reported).

## 2022-02-22 LAB
ALBUMIN SERPL-MCNC: 4.2 G/DL (ref 3.6–5.1)
ALBUMIN/GLOB SERPL: 1.6 (CALC) (ref 1–2.5)
ALP SERPL-CCNC: 60 U/L (ref 31–125)
ALT SERPL-CCNC: 18 U/L (ref 6–29)
APPEARANCE UR: CLEAR
AST SERPL-CCNC: 24 U/L (ref 10–30)
BASOPHILS # BLD AUTO: 50 CELLS/UL (ref 0–200)
BASOPHILS NFR BLD AUTO: 1.2 %
BILIRUB SERPL-MCNC: 0.6 MG/DL (ref 0.2–1.2)
BILIRUB UR QL STRIP: NEGATIVE
BUN SERPL-MCNC: 11 MG/DL (ref 7–25)
BUN/CREAT SERPL: NORMAL (CALC) (ref 6–22)
C3 SERPL-MCNC: 100 MG/DL (ref 83–193)
C4 SERPL-MCNC: 27 MG/DL (ref 15–57)
CALCIUM SERPL-MCNC: 9.3 MG/DL (ref 8.6–10.2)
CHLORIDE SERPL-SCNC: 100 MMOL/L (ref 98–110)
CO2 SERPL-SCNC: 29 MMOL/L (ref 20–32)
COLOR UR: YELLOW
CREAT SERPL-MCNC: 0.98 MG/DL (ref 0.5–1.1)
CREAT UR-MCNC: 172 MG/DL (ref 20–275)
CRP SERPL-MCNC: 0.4 MG/L
DSDNA AB SER-ACNC: 4 IU/ML
EOSINOPHIL # BLD AUTO: 298 CELLS/UL (ref 15–500)
EOSINOPHIL NFR BLD AUTO: 7.1 %
ERYTHROCYTE [DISTWIDTH] IN BLOOD BY AUTOMATED COUNT: 12.1 % (ref 11–15)
ERYTHROCYTE [SEDIMENTATION RATE] IN BLOOD BY WESTERGREN METHOD: 2 MM/H
GLOBULIN SER CALC-MCNC: 2.7 G/DL (CALC) (ref 1.9–3.7)
GLUCOSE SERPL-MCNC: 87 MG/DL (ref 65–99)
GLUCOSE UR QL STRIP: NEGATIVE
HCT VFR BLD AUTO: 39.4 % (ref 35–45)
HGB BLD-MCNC: 13.8 G/DL (ref 11.7–15.5)
HGB UR QL STRIP: NEGATIVE
KETONES UR QL STRIP: NEGATIVE
LEUKOCYTE ESTERASE UR QL STRIP: NEGATIVE
LYMPHOCYTES # BLD AUTO: 1348 CELLS/UL (ref 850–3900)
LYMPHOCYTES NFR BLD AUTO: 32.1 %
MCH RBC QN AUTO: 32.4 PG (ref 27–33)
MCHC RBC AUTO-ENTMCNC: 35 G/DL (ref 32–36)
MCV RBC AUTO: 92.5 FL (ref 80–100)
MONOCYTES # BLD AUTO: 424 CELLS/UL (ref 200–950)
MONOCYTES NFR BLD AUTO: 10.1 %
NEUTROPHILS # BLD AUTO: 2079 CELLS/UL (ref 1500–7800)
NEUTROPHILS NFR BLD AUTO: 49.5 %
NITRITE UR QL STRIP: NEGATIVE
PH UR STRIP: NORMAL [PH] (ref 5–8)
PLATELET # BLD AUTO: 209 THOUSAND/UL (ref 140–400)
PMV BLD REES-ECKER: 10.3 FL (ref 7.5–12.5)
POTASSIUM SERPL-SCNC: 3.9 MMOL/L (ref 3.5–5.3)
PROT SERPL-MCNC: 6.9 G/DL (ref 6.1–8.1)
PROT UR QL STRIP: NEGATIVE
PROT UR-MCNC: 14 MG/DL (ref 5–24)
PROT/CREAT UR: 0.08 MG/MG CREAT (ref 0.02–0.16)
PROT/CREAT UR: 81 MG/G CREAT (ref 21–161)
RBC # BLD AUTO: 4.26 MILLION/UL (ref 3.8–5.1)
SODIUM SERPL-SCNC: 138 MMOL/L (ref 135–146)
SP GR UR STRIP: 1.02 (ref 1–1.03)
WBC # BLD AUTO: 4.2 THOUSAND/UL (ref 3.8–10.8)

## 2022-02-23 DIAGNOSIS — D84.9 IMMUNOSUPPRESSED STATUS: ICD-10-CM

## 2022-03-05 ENCOUNTER — PATIENT MESSAGE (OUTPATIENT)
Dept: RHEUMATOLOGY | Facility: CLINIC | Age: 42
End: 2022-03-05
Payer: COMMERCIAL

## 2022-06-15 ENCOUNTER — TELEPHONE (OUTPATIENT)
Dept: RHEUMATOLOGY | Facility: CLINIC | Age: 42
End: 2022-06-15
Payer: COMMERCIAL

## 2022-06-15 NOTE — TELEPHONE ENCOUNTER
Left voicemail letting patient know Dr. Meyer will not be in clinic June 17, and that we have to reschedule the appointment. Asked patient to reach out to the office to reschedule the follow up

## 2022-08-03 ENCOUNTER — PATIENT MESSAGE (OUTPATIENT)
Dept: RHEUMATOLOGY | Facility: CLINIC | Age: 42
End: 2022-08-03

## 2022-08-03 ENCOUNTER — OFFICE VISIT (OUTPATIENT)
Dept: RHEUMATOLOGY | Facility: CLINIC | Age: 42
End: 2022-08-03
Payer: COMMERCIAL

## 2022-08-03 VITALS
HEART RATE: 89 BPM | TEMPERATURE: 98 F | BODY MASS INDEX: 24.72 KG/M2 | SYSTOLIC BLOOD PRESSURE: 92 MMHG | HEIGHT: 61 IN | WEIGHT: 130.94 LBS | DIASTOLIC BLOOD PRESSURE: 62 MMHG

## 2022-08-03 DIAGNOSIS — M32.9 SYSTEMIC LUPUS ERYTHEMATOSUS, UNSPECIFIED SLE TYPE, UNSPECIFIED ORGAN INVOLVEMENT STATUS: Primary | ICD-10-CM

## 2022-08-03 PROCEDURE — 3008F PR BODY MASS INDEX (BMI) DOCUMENTED: ICD-10-PCS | Mod: CPTII,S$GLB,, | Performed by: INTERNAL MEDICINE

## 2022-08-03 PROCEDURE — 99214 OFFICE O/P EST MOD 30 MIN: CPT | Mod: S$GLB,,, | Performed by: INTERNAL MEDICINE

## 2022-08-03 PROCEDURE — 1159F MED LIST DOCD IN RCRD: CPT | Mod: CPTII,S$GLB,, | Performed by: INTERNAL MEDICINE

## 2022-08-03 PROCEDURE — 99214 PR OFFICE/OUTPT VISIT, EST, LEVL IV, 30-39 MIN: ICD-10-PCS | Mod: S$GLB,,, | Performed by: INTERNAL MEDICINE

## 2022-08-03 PROCEDURE — 99999 PR PBB SHADOW E&M-EST. PATIENT-LVL IV: CPT | Mod: PBBFAC,,, | Performed by: INTERNAL MEDICINE

## 2022-08-03 PROCEDURE — 99999 PR PBB SHADOW E&M-EST. PATIENT-LVL IV: ICD-10-PCS | Mod: PBBFAC,,, | Performed by: INTERNAL MEDICINE

## 2022-08-03 PROCEDURE — 1159F PR MEDICATION LIST DOCUMENTED IN MEDICAL RECORD: ICD-10-PCS | Mod: CPTII,S$GLB,, | Performed by: INTERNAL MEDICINE

## 2022-08-03 PROCEDURE — 3078F PR MOST RECENT DIASTOLIC BLOOD PRESSURE < 80 MM HG: ICD-10-PCS | Mod: CPTII,S$GLB,, | Performed by: INTERNAL MEDICINE

## 2022-08-03 PROCEDURE — 3008F BODY MASS INDEX DOCD: CPT | Mod: CPTII,S$GLB,, | Performed by: INTERNAL MEDICINE

## 2022-08-03 PROCEDURE — 3074F SYST BP LT 130 MM HG: CPT | Mod: CPTII,S$GLB,, | Performed by: INTERNAL MEDICINE

## 2022-08-03 PROCEDURE — 3074F PR MOST RECENT SYSTOLIC BLOOD PRESSURE < 130 MM HG: ICD-10-PCS | Mod: CPTII,S$GLB,, | Performed by: INTERNAL MEDICINE

## 2022-08-03 PROCEDURE — 3078F DIAST BP <80 MM HG: CPT | Mod: CPTII,S$GLB,, | Performed by: INTERNAL MEDICINE

## 2022-08-03 RX ORDER — METHOCARBAMOL 500 MG/1
500-1000 TABLET, FILM COATED ORAL 3 TIMES DAILY PRN
COMMUNITY
Start: 2022-04-18 | End: 2023-05-19

## 2022-08-03 RX ORDER — BUTALBITAL, ACETAMINOPHEN AND CAFFEINE 50; 325; 40 MG/1; MG/1; MG/1
1 TABLET ORAL EVERY 8 HOURS PRN
COMMUNITY
Start: 2022-04-12 | End: 2022-10-07

## 2022-08-03 ASSESSMENT — ROUTINE ASSESSMENT OF PATIENT INDEX DATA (RAPID3)
FATIGUE SCORE: 0
TOTAL RAPID3 SCORE: 0.33
AM STIFFNESS SCORE: 1, YES
MDHAQ FUNCTION SCORE: 0
PATIENT GLOBAL ASSESSMENT SCORE: 0
PSYCHOLOGICAL DISTRESS SCORE: 2.2
PAIN SCORE: 1
WHEN YOU AWAKENED IN THE MORNING OVER THE LAST WEEK, PLEASE INDICATE THE AMOUNT OF TIME IT TAKES UNTIL YOU ARE AS LIMBER AS YOU WILL BE FOR THE DAY: 30 MINS

## 2022-08-03 NOTE — PROGRESS NOTES
Subjective:      Patient ID: Massiel Weston is a 34 y.o. female.    Chief Complaint: Pain    HPI   33 yo F with PMH of Hashimotos disease ( 2008), hypoglycemia, seizures here for pain. In summer 2014, she started to have to have fatigue. She also would have swelling of hands in morning.  She feels sore all over. She feels hazy.  Reports hair loss.  Reports pain in hands, wrists, and knees.  She has stiffness in morning for 2-3 hours.  Pain is 5/10.   Nothing improves the pain. Being active makes pain worse. She is on Savella for 2 months.Pain is achy and non-radiating.  She has photosensitivity over last several years. Reports painful ulcers. Hand fingers get swollen. Reports passing out in the past maybe from hypoglycemia.  Thinks she may have Raynauds. NO blood clots. Denies any miscarriages.  Reports poor sleep. No snoring.     Interval history: Reports increased stress. She has pain in hands with swelling on occasion.. She takes meloxicam on on occasion.  She has pain in left flank. Pain level is as high as 5/10.  Sometimes it radiates down.   Reports increased pain in hands, ankles, and elbows.Reports mild swelling in hands.Reports she has increased soreness recently.   Reports stiffness all over. Reports more rashes in chest. Denies hair loss.   She continues plaquenil 200mg po BID.     Grandmother- SLE      Review of Systems :    Constitutional: Positive for fatigue. Negative for chills, diaphoresis, activity change and appetite change.   HENT: Negative for congestion, ear discharge, ear pain, facial swelling, mouth sores, sinus pressure, sneezing, sore throat, tinnitus and trouble swallowing.    Eyes: Negative for photophobia, pain, discharge, redness, itching and visual disturbance.   Respiratory: Negative for apnea, chest tightness, shortness of breath, wheezing and stridor.    Cardiovascular: Negative for leg swelling.   Gastrointestinal: Negative for nausea, abdominal pain, diarrhea,  constipation, blood in stool, abdominal distention and anal bleeding.   Endocrine: Negative for cold intolerance and heat intolerance.   Genitourinary: Negative for dysuria and difficulty urinating.   Musculoskeletal: Positive for myalgias, joint swelling, arthralgias, neck pain and neck stiffness. Negative for back pain and gait problem.   Skin: Negative for color change, pallor, rash and wound.   Neurological: Negative for dizziness, seizures, light-headedness and numbness.   Hematological: Negative for adenopathy. Does not bruise/bleed easily.   Psychiatric/Behavioral: Negative for sleep disturbance. The patient is not nervous/anxious.        Objective:   Physical Exam   Vitals reviewed.  Constitutional: She is oriented to person, place, and time.   HENT:   Head: Normocephalic and atraumatic.   Right Ear: External ear normal.   Left Ear: External ear normal.   Nose: Nose normal.   Mouth/Throat: Oropharynx is clear and moist. No oropharyngeal exudate.   Eyes: Conjunctivae and EOM are normal. Pupils are equal, round, and reactive to light. Right eye exhibits no discharge. Left eye exhibits no discharge. No scleral icterus.   Neck: Neck supple. No JVD present. No thyromegaly present.   Cardiovascular: Normal rate, regular rhythm, normal heart sounds and intact distal pulses.  Exam reveals no gallop and no friction rub.    No murmur heard.  Pulmonary/Chest: Effort normal and breath sounds normal. No respiratory distress. She has no wheezes. She has no rales. She exhibits no tenderness.   Abdominal: Soft. Bowel sounds are normal. She exhibits no distension and no mass. There is no tenderness. There is no rebound and no guarding.   Lymphadenopathy:     She has no cervical adenopathy.   Neurological: She is alert and oriented to person, place, and time. No cranial nerve deficit. Gait normal. Coordination normal.   Skin: Skin is dry. No rash noted. No erythema. No pallor.     Psychiatric: Affect and judgment normal.    Musculoskeletal: She exhibits no edema or tenderness.   FROM of all joints including neck, shoulders, spine, ankles, wrists, knees, and ankles; no joint deformities noted or effusions, erythema or warmth; no tophi or nodules noted; no crepitus; no synovitis noted in hands or feet; no nail pitting or onycholysis        Outside labs:  Orly-1:640  dna-11  Ro,la-negative  rf-negative  Barboza, rnp-negative    Assessment:    40 yo F with PMH of Hashimotos disease ( 2008), hypoglycemia, seizures,pancreatitis x2, complete hysterectomy due to fibroids, nephrolithiasis, here for follow up of SLE. Patient with +ORLY, DNA, photosensitivity, Raynaud's, arthritis, and lymphopenia. Her DNA is negative here but was positive outside.     Plan:     -continue baclofen 10mg (take 1-2 tablets at bedtime)  -continue meloxicam 15 mg po qday for a week and then as needed  -continue plaquenil 200mg po BID (eye exam scheduled)    -labs at [facility] in 6  Months    30 * minutes of total time spent on the encounter, which includes face to face time and non-face to face time preparing to see the patient (eg, review of tests), Obtaining and/or reviewing separately obtained history, Documenting clinical information in the electronic or other health record, Independently interpreting results (not separately reported) and communicating results to the patient/family/caregiver, or Care coordination (not separately reported).

## 2022-08-03 NOTE — PROGRESS NOTES
Rapid3 Question Responses and Scores 8/1/2022   MDHAQ Score 0   Psychologic Score 2.2   Pain Score 1   When you awakened in the morning OVER THE LAST WEEK, did you feel stiff? Yes   If Yes, please indicate the number of hours until you are as limber as you will be for the day 30   Fatigue Score 0   Global Health Score 0   RAPID3 Score 0.33       Answers for HPI/ROS submitted by the patient on 8/1/2022  fever: No  eye redness: No  mouth sores: No  headaches: Yes  shortness of breath: No  chest pain: No  trouble swallowing: No  diarrhea: No  constipation: No  unexpected weight change: No  genital sore: No  dysuria: No  During the last 3 days, have you had a skin rash?: No  Bruises or bleeds easily: Yes  cough: No

## 2022-08-05 LAB
APPEARANCE UR: CLEAR
BILIRUB UR QL STRIP: NEGATIVE
C3 SERPL-MCNC: 99 MG/DL (ref 83–193)
C4 SERPL-MCNC: 24 MG/DL (ref 15–57)
COLOR UR: YELLOW
CREAT UR-MCNC: 25 MG/DL (ref 20–275)
GLUCOSE UR QL STRIP: NEGATIVE
HGB UR QL STRIP: NEGATIVE
KETONES UR QL STRIP: NEGATIVE
LEUKOCYTE ESTERASE UR QL STRIP: NEGATIVE
NITRITE UR QL STRIP: NEGATIVE
PH UR STRIP: 6.5 [PH] (ref 5–8)
PROT UR QL STRIP: NEGATIVE
PROT UR-MCNC: <4 MG/DL (ref 5–24)
PROT/CREAT UR: ABNORMAL MG/G CREAT (ref 21–161)
PROT/CREAT UR: ABNORMAL MG/MG CREAT (ref 0.02–0.16)
SP GR UR STRIP: 1 (ref 1–1.03)

## 2022-08-09 LAB
ALBUMIN SERPL-MCNC: 4 G/DL (ref 3.6–5.1)
ALBUMIN/GLOB SERPL: 1.5 (CALC) (ref 1–2.5)
ALP SERPL-CCNC: 43 U/L (ref 31–125)
ALT SERPL-CCNC: 16 U/L (ref 6–29)
AST SERPL-CCNC: 23 U/L (ref 10–30)
BASOPHILS # BLD AUTO: 52 CELLS/UL (ref 0–200)
BASOPHILS NFR BLD AUTO: 1.3 %
BILIRUB SERPL-MCNC: 0.6 MG/DL (ref 0.2–1.2)
BUN SERPL-MCNC: 17 MG/DL (ref 7–25)
BUN/CREAT SERPL: 16 (CALC) (ref 6–22)
CALCIUM SERPL-MCNC: 9.1 MG/DL (ref 8.6–10.2)
CHLORIDE SERPL-SCNC: 101 MMOL/L (ref 98–110)
CO2 SERPL-SCNC: 31 MMOL/L (ref 20–32)
CREAT SERPL-MCNC: 1.09 MG/DL (ref 0.5–0.99)
CRP SERPL-MCNC: 0.4 MG/L
DSDNA AB SER-ACNC: 4 IU/ML
EGFR: 65 ML/MIN/1.73M2
EOSINOPHIL # BLD AUTO: 356 CELLS/UL (ref 15–500)
EOSINOPHIL NFR BLD AUTO: 8.9 %
ERYTHROCYTE [DISTWIDTH] IN BLOOD BY AUTOMATED COUNT: 12.1 % (ref 11–15)
ERYTHROCYTE [SEDIMENTATION RATE] IN BLOOD BY WESTERGREN METHOD: 2 MM/H
GLOBULIN SER CALC-MCNC: 2.6 G/DL (CALC) (ref 1.9–3.7)
GLUCOSE SERPL-MCNC: 77 MG/DL (ref 65–99)
HCT VFR BLD AUTO: 38.8 % (ref 35–45)
HGB BLD-MCNC: 13.5 G/DL (ref 11.7–15.5)
LYMPHOCYTES # BLD AUTO: 1316 CELLS/UL (ref 850–3900)
LYMPHOCYTES NFR BLD AUTO: 32.9 %
MCH RBC QN AUTO: 32.8 PG (ref 27–33)
MCHC RBC AUTO-ENTMCNC: 34.8 G/DL (ref 32–36)
MCV RBC AUTO: 94.4 FL (ref 80–100)
MONOCYTES # BLD AUTO: 456 CELLS/UL (ref 200–950)
MONOCYTES NFR BLD AUTO: 11.4 %
NEUTROPHILS # BLD AUTO: 1820 CELLS/UL (ref 1500–7800)
NEUTROPHILS NFR BLD AUTO: 45.5 %
PLATELET # BLD AUTO: 173 THOUSAND/UL (ref 140–400)
PMV BLD REES-ECKER: 10.1 FL (ref 7.5–12.5)
POTASSIUM SERPL-SCNC: 4.2 MMOL/L (ref 3.5–5.3)
PROT SERPL-MCNC: 6.6 G/DL (ref 6.1–8.1)
RBC # BLD AUTO: 4.11 MILLION/UL (ref 3.8–5.1)
SODIUM SERPL-SCNC: 137 MMOL/L (ref 135–146)
WBC # BLD AUTO: 4 THOUSAND/UL (ref 3.8–10.8)

## 2022-09-22 PROBLEM — U07.1 COVID-19: Status: RESOLVED | Noted: 2022-01-05 | Resolved: 2022-09-22

## 2022-09-22 PROBLEM — Z51.81 ENCOUNTER FOR THERAPEUTIC DRUG MONITORING: Status: RESOLVED | Noted: 2019-07-10 | Resolved: 2022-09-22

## 2022-10-11 ENCOUNTER — PATIENT MESSAGE (OUTPATIENT)
Dept: RHEUMATOLOGY | Facility: CLINIC | Age: 42
End: 2022-10-11
Payer: COMMERCIAL

## 2022-10-13 RX ORDER — BACLOFEN 10 MG/1
20 TABLET ORAL NIGHTLY
Qty: 180 TABLET | Refills: 3 | Status: SHIPPED | OUTPATIENT
Start: 2022-10-13 | End: 2024-01-26

## 2023-05-19 PROBLEM — R23.1 LIVEDO RETICULARIS: Status: ACTIVE | Noted: 2023-05-19

## 2023-05-19 PROBLEM — N39.3 STRESS INCONTINENCE: Status: RESOLVED | Noted: 2021-03-09 | Resolved: 2023-05-19

## 2023-05-19 PROBLEM — N20.1 CALCULUS OF URETER: Status: RESOLVED | Noted: 2020-12-01 | Resolved: 2023-05-19

## 2023-05-19 PROBLEM — R31.0 GROSS HEMATURIA: Status: RESOLVED | Noted: 2020-12-01 | Resolved: 2023-05-19

## 2023-08-15 PROBLEM — R10.13 EPIGASTRIC ABDOMINAL PAIN: Status: ACTIVE | Noted: 2023-08-15

## 2023-08-25 DIAGNOSIS — M32.9 SYSTEMIC LUPUS ERYTHEMATOSUS, UNSPECIFIED SLE TYPE, UNSPECIFIED ORGAN INVOLVEMENT STATUS: ICD-10-CM

## 2023-08-25 RX ORDER — HYDROXYCHLOROQUINE SULFATE 200 MG/1
TABLET, FILM COATED ORAL
Qty: 60 TABLET | Refills: 6 | Status: SHIPPED | OUTPATIENT
Start: 2023-08-25 | End: 2024-04-01

## 2024-01-26 PROBLEM — Z79.890 POST-MENOPAUSE ON HRT (HORMONE REPLACEMENT THERAPY): Status: ACTIVE | Noted: 2019-09-10

## 2024-01-26 PROBLEM — G40.209 PARTIAL COMPLEX SEIZURE DISORDER WITHOUT INTRACTABLE EPILEPSY: Status: ACTIVE | Noted: 2024-01-26

## 2024-03-28 DIAGNOSIS — M32.9 SYSTEMIC LUPUS ERYTHEMATOSUS, UNSPECIFIED SLE TYPE, UNSPECIFIED ORGAN INVOLVEMENT STATUS: ICD-10-CM

## 2024-04-01 RX ORDER — HYDROXYCHLOROQUINE SULFATE 200 MG/1
TABLET, FILM COATED ORAL
Qty: 60 TABLET | Refills: 6 | Status: SHIPPED | OUTPATIENT
Start: 2024-04-01

## 2024-04-01 RX ORDER — MELOXICAM 15 MG/1
TABLET ORAL
Qty: 30 TABLET | Refills: 11 | Status: SHIPPED | OUTPATIENT
Start: 2024-04-01

## 2024-04-01 NOTE — TELEPHONE ENCOUNTER
Refill request sent to Alethea Phillips NP for approval of the following medications:     meloxicam (MOBIC) 15 MG tablet     Last visit: 01/26/2024   Next visit: 04/26/2024      [facility] - [place] [facility] - [address]  [address]  [place] [address]  Phone: [phone] Fax: [phone]

## 2024-04-15 ENCOUNTER — OFFICE VISIT (OUTPATIENT)
Dept: RHEUMATOLOGY | Facility: CLINIC | Age: 44
End: 2024-04-15
Payer: COMMERCIAL

## 2024-04-15 VITALS
WEIGHT: 144.38 LBS | HEIGHT: 61 IN | SYSTOLIC BLOOD PRESSURE: 108 MMHG | HEART RATE: 60 BPM | BODY MASS INDEX: 27.26 KG/M2 | DIASTOLIC BLOOD PRESSURE: 66 MMHG

## 2024-04-15 DIAGNOSIS — D84.821 IMMUNODEFICIENCY DUE TO DRUG THERAPY: ICD-10-CM

## 2024-04-15 DIAGNOSIS — Z79.899 IMMUNODEFICIENCY DUE TO DRUG THERAPY: ICD-10-CM

## 2024-04-15 DIAGNOSIS — M32.9 SYSTEMIC LUPUS ERYTHEMATOSUS, UNSPECIFIED SLE TYPE, UNSPECIFIED ORGAN INVOLVEMENT STATUS: Primary | ICD-10-CM

## 2024-04-15 PROCEDURE — 99999 PR PBB SHADOW E&M-EST. PATIENT-LVL IV: CPT | Mod: PBBFAC,,, | Performed by: INTERNAL MEDICINE

## 2024-04-15 PROCEDURE — 99214 OFFICE O/P EST MOD 30 MIN: CPT | Mod: S$GLB,,, | Performed by: INTERNAL MEDICINE

## 2024-04-15 PROCEDURE — 3044F HG A1C LEVEL LT 7.0%: CPT | Mod: CPTII,S$GLB,, | Performed by: INTERNAL MEDICINE

## 2024-04-15 PROCEDURE — 3078F DIAST BP <80 MM HG: CPT | Mod: CPTII,S$GLB,, | Performed by: INTERNAL MEDICINE

## 2024-04-15 PROCEDURE — 3008F BODY MASS INDEX DOCD: CPT | Mod: CPTII,S$GLB,, | Performed by: INTERNAL MEDICINE

## 2024-04-15 PROCEDURE — 1159F MED LIST DOCD IN RCRD: CPT | Mod: CPTII,S$GLB,, | Performed by: INTERNAL MEDICINE

## 2024-04-15 PROCEDURE — 3074F SYST BP LT 130 MM HG: CPT | Mod: CPTII,S$GLB,, | Performed by: INTERNAL MEDICINE

## 2024-04-15 NOTE — PROGRESS NOTES
Subjective:      Patient ID: Massiel Weston is a 34 y.o. female.    Chief Complaint: Pain    HPI   35 yo F with PMH of Hashimotos disease ( 2008), hypoglycemia, seizures here for pain. In summer 2014, she started to have to have fatigue. She also would have swelling of hands in morning.  She feels sore all over. She feels hazy.  Reports hair loss.  Reports pain in hands, wrists, and knees.  She has stiffness in morning for 2-3 hours.  Pain is 5/10.   Nothing improves the pain. Being active makes pain worse. She is on Savella for 2 months.Pain is achy and non-radiating.  She has photosensitivity over last several years. Reports painful ulcers. Hand fingers get swollen. Reports passing out in the past maybe from hypoglycemia.  Thinks she may have Raynauds. NO blood clots. Denies any miscarriages.  Reports poor sleep. No snoring.     Interval history: Reports increased stress. She has pain in hands with swelling on occasion.. She takes meloxicam on on occasion.  She has pain in left flank. Pain level is as high as 5/10.  Sometimes it radiates down.   Reports increased pain in hands, ankles, and elbows.Reports mild swelling in hands.Reports she has increased soreness recently.   Reports stiffness all over. Reports more rashes in chest. Denies hair loss.   She continues plaquenil 200mg po BID.     Grandmother- SLE      Review of Systems :    Constitutional: Positive for fatigue. Negative for chills, diaphoresis, activity change and appetite change.   HENT: Negative for congestion, ear discharge, ear pain, facial swelling, mouth sores, sinus pressure, sneezing, sore throat, tinnitus and trouble swallowing.    Eyes: Negative for photophobia, pain, discharge, redness, itching and visual disturbance.   Respiratory: Negative for apnea, chest tightness, shortness of breath, wheezing and stridor.    Cardiovascular: Negative for leg swelling.   Gastrointestinal: Negative for nausea, abdominal pain, diarrhea,  constipation, blood in stool, abdominal distention and anal bleeding.   Endocrine: Negative for cold intolerance and heat intolerance.   Genitourinary: Negative for dysuria and difficulty urinating.   Musculoskeletal: Positive for myalgias, joint swelling, arthralgias, neck pain and neck stiffness. Negative for back pain and gait problem.   Skin: Negative for color change, pallor, rash and wound.   Neurological: Negative for dizziness, seizures, light-headedness and numbness.   Hematological: Negative for adenopathy. Does not bruise/bleed easily.   Psychiatric/Behavioral: Negative for sleep disturbance. The patient is not nervous/anxious.        Objective:   Physical Exam   Vitals reviewed.  Constitutional: She is oriented to person, place, and time.   HENT:   Head: Normocephalic and atraumatic.   Right Ear: External ear normal.   Left Ear: External ear normal.   Nose: Nose normal.   Mouth/Throat: Oropharynx is clear and moist. No oropharyngeal exudate.   Eyes: Conjunctivae and EOM are normal. Pupils are equal, round, and reactive to light. Right eye exhibits no discharge. Left eye exhibits no discharge. No scleral icterus.   Neck: Neck supple. No JVD present. No thyromegaly present.   Cardiovascular: Normal rate, regular rhythm, normal heart sounds and intact distal pulses.  Exam reveals no gallop and no friction rub.    No murmur heard.  Pulmonary/Chest: Effort normal and breath sounds normal. No respiratory distress. She has no wheezes. She has no rales. She exhibits no tenderness.   Abdominal: Soft. Bowel sounds are normal. She exhibits no distension and no mass. There is no tenderness. There is no rebound and no guarding.   Lymphadenopathy:     She has no cervical adenopathy.   Neurological: She is alert and oriented to person, place, and time. No cranial nerve deficit. Gait normal. Coordination normal.   Skin: Skin is dry. No rash noted. No erythema. No pallor.     Psychiatric: Affect and judgment normal.    Musculoskeletal: She exhibits no edema or tenderness.   FROM of all joints including neck, shoulders, spine, ankles, wrists, knees, and ankles; no joint deformities noted or effusions, erythema or warmth; no tophi or nodules noted; no crepitus; no synovitis noted in hands or feet; no nail pitting or onycholysis        Outside labs:  Orly-1:640  dna-11  Ro,la-negative  rf-negative  Barboza, rnp-negative    Assessment:    43 yo F with PMH of Hashimotos disease ( 2008), hypoglycemia, seizures,pancreatitis x2, complete hysterectomy due to fibroids, nephrolithiasis, here for follow up of SLE. Patient with +ORLY, DNA, photosensitivity, Raynaud's, arthritis, and lymphopenia. Her DNA is negative here but was positive outside.     Plan:     -continue baclofen 10mg (take 1-2 tablets at bedtime)  -continue meloxicam 15 mg po qday for a week and then as needed  -continue plaquenil 200mg po BID (eye exam scheduled in July)    -labs at [facility] in 12   Months    30 * minutes of total time spent on the encounter, which includes face to face time and non-face to face time preparing to see the patient (eg, review of tests), Obtaining and/or reviewing separately obtained history, Documenting clinical information in the electronic or other health record, Independently interpreting results (not separately reported) and communicating results to the patient/family/caregiver, or Care coordination (not separately reported).

## 2024-04-18 LAB
APPEARANCE UR: CLEAR
BASOPHILS # BLD AUTO: 59 CELLS/UL (ref 0–200)
BASOPHILS NFR BLD AUTO: 1.6 %
BILIRUB UR QL STRIP: NEGATIVE
C3 SERPL-MCNC: 111 MG/DL (ref 83–193)
C4 SERPL-MCNC: 23 MG/DL (ref 15–57)
COLOR UR: YELLOW
CREAT UR-MCNC: 27 MG/DL (ref 20–275)
CRP SERPL-MCNC: <3 MG/L
DSDNA AB SER-ACNC: 2 IU/ML
EOSINOPHIL # BLD AUTO: 481 CELLS/UL (ref 15–500)
EOSINOPHIL NFR BLD AUTO: 13 %
ERYTHROCYTE [DISTWIDTH] IN BLOOD BY AUTOMATED COUNT: 11.9 % (ref 11–15)
ERYTHROCYTE [SEDIMENTATION RATE] IN BLOOD BY WESTERGREN METHOD: 2 MM/H
GLUCOSE UR QL STRIP: NEGATIVE
HCT VFR BLD AUTO: 36.4 % (ref 35–45)
HGB BLD-MCNC: 12.7 G/DL (ref 11.7–15.5)
HGB UR QL STRIP: NEGATIVE
KETONES UR QL STRIP: NEGATIVE
LEUKOCYTE ESTERASE UR QL STRIP: NEGATIVE
LYMPHOCYTES # BLD AUTO: 1243 CELLS/UL (ref 850–3900)
LYMPHOCYTES NFR BLD AUTO: 33.6 %
MCH RBC QN AUTO: 32.4 PG (ref 27–33)
MCHC RBC AUTO-ENTMCNC: 34.9 G/DL (ref 32–36)
MCV RBC AUTO: 92.9 FL (ref 80–100)
MONOCYTES # BLD AUTO: 381 CELLS/UL (ref 200–950)
MONOCYTES NFR BLD AUTO: 10.3 %
NEUTROPHILS # BLD AUTO: 1536 CELLS/UL (ref 1500–7800)
NEUTROPHILS NFR BLD AUTO: 41.5 %
NITRITE UR QL STRIP: NEGATIVE
PH UR STRIP: 7 [PH] (ref 5–8)
PLATELET # BLD AUTO: 199 THOUSAND/UL (ref 140–400)
PMV BLD REES-ECKER: 11 FL (ref 7.5–12.5)
PROT UR QL STRIP: NEGATIVE
PROT UR-MCNC: <4 MG/DL (ref 5–24)
PROT/CREAT UR: ABNORMAL MG/G CREAT (ref 24–184)
PROT/CREAT UR: ABNORMAL MG/MG CREAT (ref 0.02–0.18)
RBC # BLD AUTO: 3.92 MILLION/UL (ref 3.8–5.1)
SP GR UR STRIP: 1.01 (ref 1–1.03)
WBC # BLD AUTO: 3.7 THOUSAND/UL (ref 3.8–10.8)

## 2024-04-26 PROBLEM — D70.9 NEUTROPENIA, UNSPECIFIED TYPE: Status: RESOLVED | Noted: 2021-11-17 | Resolved: 2024-04-26

## 2024-07-26 PROBLEM — I73.00 RAYNAUD DISEASE: Status: ACTIVE | Noted: 2023-05-19

## 2024-07-26 PROBLEM — F15.90 OTHER STIMULANT USE, UNSPECIFIED, UNCOMPLICATED: Status: RESOLVED | Noted: 2020-03-10 | Resolved: 2024-07-26

## 2024-07-26 PROBLEM — R10.13 EPIGASTRIC ABDOMINAL PAIN: Status: RESOLVED | Noted: 2023-08-15 | Resolved: 2024-07-26

## 2024-10-23 ENCOUNTER — PATIENT MESSAGE (OUTPATIENT)
Dept: GASTROENTEROLOGY | Facility: CLINIC | Age: 44
End: 2024-10-23
Payer: COMMERCIAL

## 2024-11-04 DIAGNOSIS — M32.9 SYSTEMIC LUPUS ERYTHEMATOSUS, UNSPECIFIED SLE TYPE, UNSPECIFIED ORGAN INVOLVEMENT STATUS: ICD-10-CM

## 2024-11-04 RX ORDER — HYDROXYCHLOROQUINE SULFATE 200 MG/1
200 TABLET, FILM COATED ORAL 2 TIMES DAILY
Qty: 60 TABLET | Refills: 6 | Status: SHIPPED | OUTPATIENT
Start: 2024-11-04

## 2024-11-27 RX ORDER — BACLOFEN 10 MG/1
20 TABLET ORAL NIGHTLY
Qty: 180 TABLET | Refills: 3 | Status: SHIPPED | OUTPATIENT
Start: 2024-11-27 | End: 2025-11-27

## 2025-06-04 DIAGNOSIS — M32.9 SYSTEMIC LUPUS ERYTHEMATOSUS, UNSPECIFIED SLE TYPE, UNSPECIFIED ORGAN INVOLVEMENT STATUS: ICD-10-CM

## 2025-06-06 RX ORDER — HYDROXYCHLOROQUINE SULFATE 200 MG/1
200 TABLET, FILM COATED ORAL 2 TIMES DAILY
Qty: 60 TABLET | Refills: 6 | Status: SHIPPED | OUTPATIENT
Start: 2025-06-06

## 2025-08-13 ENCOUNTER — OFFICE VISIT (OUTPATIENT)
Dept: RHEUMATOLOGY | Facility: CLINIC | Age: 45
End: 2025-08-13
Payer: COMMERCIAL

## 2025-08-13 ENCOUNTER — PATIENT MESSAGE (OUTPATIENT)
Dept: RHEUMATOLOGY | Facility: CLINIC | Age: 45
End: 2025-08-13

## 2025-08-13 VITALS
DIASTOLIC BLOOD PRESSURE: 72 MMHG | HEART RATE: 76 BPM | HEIGHT: 61 IN | SYSTOLIC BLOOD PRESSURE: 107 MMHG | BODY MASS INDEX: 23.39 KG/M2 | WEIGHT: 123.88 LBS

## 2025-08-13 DIAGNOSIS — D84.821 IMMUNODEFICIENCY DUE TO DRUG THERAPY: ICD-10-CM

## 2025-08-13 DIAGNOSIS — M32.9 SYSTEMIC LUPUS ERYTHEMATOSUS, UNSPECIFIED SLE TYPE, UNSPECIFIED ORGAN INVOLVEMENT STATUS: Primary | ICD-10-CM

## 2025-08-13 DIAGNOSIS — Z79.899 IMMUNODEFICIENCY DUE TO DRUG THERAPY: ICD-10-CM

## 2025-08-13 PROCEDURE — 3074F SYST BP LT 130 MM HG: CPT | Mod: CPTII,S$GLB,, | Performed by: INTERNAL MEDICINE

## 2025-08-13 PROCEDURE — 3008F BODY MASS INDEX DOCD: CPT | Mod: CPTII,S$GLB,, | Performed by: INTERNAL MEDICINE

## 2025-08-13 PROCEDURE — 99999 PR PBB SHADOW E&M-EST. PATIENT-LVL III: CPT | Mod: PBBFAC,,, | Performed by: INTERNAL MEDICINE

## 2025-08-13 PROCEDURE — 1159F MED LIST DOCD IN RCRD: CPT | Mod: CPTII,S$GLB,, | Performed by: INTERNAL MEDICINE

## 2025-08-13 PROCEDURE — 99214 OFFICE O/P EST MOD 30 MIN: CPT | Mod: S$GLB,,, | Performed by: INTERNAL MEDICINE

## 2025-08-13 PROCEDURE — G2211 COMPLEX E/M VISIT ADD ON: HCPCS | Mod: S$GLB,,, | Performed by: INTERNAL MEDICINE

## 2025-08-13 PROCEDURE — 3044F HG A1C LEVEL LT 7.0%: CPT | Mod: CPTII,S$GLB,, | Performed by: INTERNAL MEDICINE

## 2025-08-13 PROCEDURE — 3078F DIAST BP <80 MM HG: CPT | Mod: CPTII,S$GLB,, | Performed by: INTERNAL MEDICINE
